# Patient Record
Sex: MALE | Race: WHITE | Employment: FULL TIME | URBAN - NONMETROPOLITAN AREA
[De-identification: names, ages, dates, MRNs, and addresses within clinical notes are randomized per-mention and may not be internally consistent; named-entity substitution may affect disease eponyms.]

---

## 2017-02-03 RX ORDER — HYDROCHLOROTHIAZIDE 25 MG/1
25 TABLET ORAL DAILY
Qty: 30 TABLET | Refills: 0 | Status: SHIPPED | OUTPATIENT
Start: 2017-02-03 | End: 2017-02-03

## 2017-02-21 ENCOUNTER — OFFICE VISIT (OUTPATIENT)
Dept: FAMILY MEDICINE CLINIC | Age: 42
End: 2017-02-21

## 2017-02-21 VITALS
BODY MASS INDEX: 50.12 KG/M2 | SYSTOLIC BLOOD PRESSURE: 140 MMHG | HEART RATE: 80 BPM | RESPIRATION RATE: 16 BRPM | DIASTOLIC BLOOD PRESSURE: 70 MMHG | WEIGHT: 315 LBS

## 2017-02-21 DIAGNOSIS — Z72.0 TOBACCO ABUSE: ICD-10-CM

## 2017-02-21 DIAGNOSIS — E88.81 DYSMETABOLIC SYNDROME: ICD-10-CM

## 2017-02-21 DIAGNOSIS — E78.00 PURE HYPERCHOLESTEROLEMIA: Primary | ICD-10-CM

## 2017-02-21 DIAGNOSIS — Z99.89 OBSTRUCTIVE SLEEP APNEA ON CPAP: ICD-10-CM

## 2017-02-21 DIAGNOSIS — M1A.0790 IDIOPATHIC CHRONIC GOUT OF FOOT WITHOUT TOPHUS, UNSPECIFIED LATERALITY: ICD-10-CM

## 2017-02-21 DIAGNOSIS — G47.33 OBSTRUCTIVE SLEEP APNEA ON CPAP: ICD-10-CM

## 2017-02-21 DIAGNOSIS — I10 ESSENTIAL HYPERTENSION: ICD-10-CM

## 2017-02-21 DIAGNOSIS — K21.9 GASTROESOPHAGEAL REFLUX DISEASE WITHOUT ESOPHAGITIS: ICD-10-CM

## 2017-02-21 PROCEDURE — 4004F PT TOBACCO SCREEN RCVD TLK: CPT | Performed by: FAMILY MEDICINE

## 2017-02-21 PROCEDURE — G8427 DOCREV CUR MEDS BY ELIG CLIN: HCPCS | Performed by: FAMILY MEDICINE

## 2017-02-21 PROCEDURE — 99214 OFFICE O/P EST MOD 30 MIN: CPT | Performed by: FAMILY MEDICINE

## 2017-02-21 PROCEDURE — G8484 FLU IMMUNIZE NO ADMIN: HCPCS | Performed by: FAMILY MEDICINE

## 2017-02-21 PROCEDURE — G8417 CALC BMI ABV UP PARAM F/U: HCPCS | Performed by: FAMILY MEDICINE

## 2017-02-21 RX ORDER — HYDROCHLOROTHIAZIDE 25 MG/1
TABLET ORAL
Qty: 30 TABLET | Refills: 11 | Status: SHIPPED | OUTPATIENT
Start: 2017-02-21 | End: 2018-03-01 | Stop reason: SDUPTHER

## 2017-02-21 RX ORDER — VARENICLINE TARTRATE 1 MG/1
1 TABLET, FILM COATED ORAL 2 TIMES DAILY
Qty: 60 TABLET | Refills: 4 | Status: SHIPPED | OUTPATIENT
Start: 2017-02-21 | End: 2017-05-31

## 2017-02-21 RX ORDER — VARENICLINE TARTRATE 25 MG
KIT ORAL
Qty: 1 EACH | Refills: 0 | Status: SHIPPED | OUTPATIENT
Start: 2017-02-21 | End: 2017-05-31

## 2017-02-21 RX ORDER — LOSARTAN POTASSIUM 50 MG/1
50 TABLET ORAL 2 TIMES DAILY
Qty: 60 TABLET | Refills: 11 | Status: SHIPPED | OUTPATIENT
Start: 2017-02-21 | End: 2017-02-28 | Stop reason: SDUPTHER

## 2017-02-21 RX ORDER — ATORVASTATIN CALCIUM 20 MG/1
TABLET, FILM COATED ORAL
Qty: 30 TABLET | Refills: 11 | Status: SHIPPED | OUTPATIENT
Start: 2017-02-21 | End: 2018-03-08 | Stop reason: SDUPTHER

## 2017-02-21 RX ORDER — ALLOPURINOL 100 MG/1
TABLET ORAL
Qty: 30 TABLET | Refills: 11 | Status: SHIPPED | OUTPATIENT
Start: 2017-02-21 | End: 2018-03-08 | Stop reason: SDUPTHER

## 2017-02-28 DIAGNOSIS — I10 ESSENTIAL HYPERTENSION: ICD-10-CM

## 2017-03-01 ENCOUNTER — OFFICE VISIT (OUTPATIENT)
Dept: CARDIOLOGY | Age: 42
End: 2017-03-01

## 2017-03-01 VITALS
DIASTOLIC BLOOD PRESSURE: 72 MMHG | WEIGHT: 315 LBS | SYSTOLIC BLOOD PRESSURE: 122 MMHG | BODY MASS INDEX: 39.17 KG/M2 | HEIGHT: 75 IN | HEART RATE: 76 BPM

## 2017-03-01 DIAGNOSIS — R06.02 SOB (SHORTNESS OF BREATH): ICD-10-CM

## 2017-03-01 DIAGNOSIS — E78.00 PURE HYPERCHOLESTEROLEMIA: Primary | ICD-10-CM

## 2017-03-01 DIAGNOSIS — Z82.49: ICD-10-CM

## 2017-03-01 DIAGNOSIS — G47.33 OBSTRUCTIVE SLEEP APNEA ON CPAP: ICD-10-CM

## 2017-03-01 DIAGNOSIS — E88.81 DYSMETABOLIC SYNDROME: ICD-10-CM

## 2017-03-01 DIAGNOSIS — Z99.89 OBSTRUCTIVE SLEEP APNEA ON CPAP: ICD-10-CM

## 2017-03-01 DIAGNOSIS — Z72.0 TOBACCO ABUSE: ICD-10-CM

## 2017-03-01 PROCEDURE — G8484 FLU IMMUNIZE NO ADMIN: HCPCS | Performed by: INTERNAL MEDICINE

## 2017-03-01 PROCEDURE — 99213 OFFICE O/P EST LOW 20 MIN: CPT | Performed by: INTERNAL MEDICINE

## 2017-03-01 PROCEDURE — G8417 CALC BMI ABV UP PARAM F/U: HCPCS | Performed by: INTERNAL MEDICINE

## 2017-03-01 PROCEDURE — G8427 DOCREV CUR MEDS BY ELIG CLIN: HCPCS | Performed by: INTERNAL MEDICINE

## 2017-03-01 PROCEDURE — 4004F PT TOBACCO SCREEN RCVD TLK: CPT | Performed by: INTERNAL MEDICINE

## 2017-03-01 RX ORDER — LOSARTAN POTASSIUM 50 MG/1
50 TABLET ORAL 2 TIMES DAILY
Qty: 60 TABLET | Refills: 11 | Status: SHIPPED | OUTPATIENT
Start: 2017-03-01 | End: 2018-03-08 | Stop reason: SDUPTHER

## 2017-05-31 ENCOUNTER — OFFICE VISIT (OUTPATIENT)
Dept: PULMONOLOGY | Age: 42
End: 2017-05-31

## 2017-05-31 VITALS
HEART RATE: 80 BPM | DIASTOLIC BLOOD PRESSURE: 68 MMHG | BODY MASS INDEX: 39.17 KG/M2 | SYSTOLIC BLOOD PRESSURE: 118 MMHG | WEIGHT: 315 LBS | OXYGEN SATURATION: 97 % | HEIGHT: 75 IN

## 2017-05-31 DIAGNOSIS — Z99.89 OSA ON CPAP: Primary | ICD-10-CM

## 2017-05-31 DIAGNOSIS — G47.33 OSA ON CPAP: Primary | ICD-10-CM

## 2017-05-31 PROCEDURE — 4004F PT TOBACCO SCREEN RCVD TLK: CPT | Performed by: INTERNAL MEDICINE

## 2017-05-31 PROCEDURE — G8427 DOCREV CUR MEDS BY ELIG CLIN: HCPCS | Performed by: INTERNAL MEDICINE

## 2017-05-31 PROCEDURE — G8417 CALC BMI ABV UP PARAM F/U: HCPCS | Performed by: INTERNAL MEDICINE

## 2017-05-31 PROCEDURE — 99213 OFFICE O/P EST LOW 20 MIN: CPT | Performed by: INTERNAL MEDICINE

## 2018-03-01 DIAGNOSIS — I10 ESSENTIAL HYPERTENSION: ICD-10-CM

## 2018-03-02 RX ORDER — HYDROCHLOROTHIAZIDE 25 MG/1
TABLET ORAL
Qty: 30 TABLET | Refills: 11 | Status: SHIPPED | OUTPATIENT
Start: 2018-03-02 | End: 2019-02-27 | Stop reason: SDUPTHER

## 2018-03-08 DIAGNOSIS — E78.00 PURE HYPERCHOLESTEROLEMIA: ICD-10-CM

## 2018-03-08 DIAGNOSIS — M1A.0790 IDIOPATHIC CHRONIC GOUT OF FOOT WITHOUT TOPHUS, UNSPECIFIED LATERALITY: ICD-10-CM

## 2018-03-08 DIAGNOSIS — I10 ESSENTIAL HYPERTENSION: ICD-10-CM

## 2018-03-08 RX ORDER — ALLOPURINOL 100 MG/1
TABLET ORAL
Qty: 30 TABLET | Refills: 11 | Status: SHIPPED | OUTPATIENT
Start: 2018-03-08 | End: 2019-02-27 | Stop reason: SDUPTHER

## 2018-03-08 RX ORDER — LOSARTAN POTASSIUM 50 MG/1
50 TABLET ORAL 2 TIMES DAILY
Qty: 60 TABLET | Refills: 11 | Status: SHIPPED | OUTPATIENT
Start: 2018-03-08 | End: 2019-02-27 | Stop reason: SDUPTHER

## 2018-03-08 RX ORDER — ATORVASTATIN CALCIUM 20 MG/1
TABLET, FILM COATED ORAL
Qty: 30 TABLET | Refills: 11 | Status: SHIPPED | OUTPATIENT
Start: 2018-03-08 | End: 2019-02-27 | Stop reason: SDUPTHER

## 2018-03-15 ENCOUNTER — OFFICE VISIT (OUTPATIENT)
Dept: FAMILY MEDICINE CLINIC | Age: 43
End: 2018-03-15
Payer: COMMERCIAL

## 2018-03-15 VITALS
SYSTOLIC BLOOD PRESSURE: 124 MMHG | RESPIRATION RATE: 16 BRPM | OXYGEN SATURATION: 99 % | DIASTOLIC BLOOD PRESSURE: 80 MMHG | HEIGHT: 74 IN | BODY MASS INDEX: 40.43 KG/M2 | HEART RATE: 87 BPM | WEIGHT: 315 LBS

## 2018-03-15 DIAGNOSIS — Z99.89 OBSTRUCTIVE SLEEP APNEA ON CPAP: ICD-10-CM

## 2018-03-15 DIAGNOSIS — Z72.0 TOBACCO ABUSE: ICD-10-CM

## 2018-03-15 DIAGNOSIS — E78.00 PURE HYPERCHOLESTEROLEMIA: ICD-10-CM

## 2018-03-15 DIAGNOSIS — K21.9 GASTROESOPHAGEAL REFLUX DISEASE WITHOUT ESOPHAGITIS: ICD-10-CM

## 2018-03-15 DIAGNOSIS — G47.33 OBSTRUCTIVE SLEEP APNEA ON CPAP: ICD-10-CM

## 2018-03-15 DIAGNOSIS — M79.10 MYALGIA: ICD-10-CM

## 2018-03-15 DIAGNOSIS — M1A.09X0 IDIOPATHIC CHRONIC GOUT OF MULTIPLE SITES WITHOUT TOPHUS: ICD-10-CM

## 2018-03-15 DIAGNOSIS — Z00.00 ROUTINE GENERAL MEDICAL EXAMINATION AT A HEALTH CARE FACILITY: ICD-10-CM

## 2018-03-15 DIAGNOSIS — Z83.3 FAMILY HISTORY OF DIABETES MELLITUS: ICD-10-CM

## 2018-03-15 DIAGNOSIS — E88.81 DYSMETABOLIC SYNDROME: ICD-10-CM

## 2018-03-15 DIAGNOSIS — I10 ESSENTIAL HYPERTENSION: Primary | ICD-10-CM

## 2018-03-15 LAB
INFLUENZA VIRUS A RNA: NEGATIVE
INFLUENZA VIRUS B RNA: NEGATIVE

## 2018-03-15 PROCEDURE — 99214 OFFICE O/P EST MOD 30 MIN: CPT | Performed by: FAMILY MEDICINE

## 2018-03-15 PROCEDURE — 4004F PT TOBACCO SCREEN RCVD TLK: CPT | Performed by: FAMILY MEDICINE

## 2018-03-15 PROCEDURE — 87502 INFLUENZA DNA AMP PROBE: CPT | Performed by: FAMILY MEDICINE

## 2018-03-15 PROCEDURE — G8427 DOCREV CUR MEDS BY ELIG CLIN: HCPCS | Performed by: FAMILY MEDICINE

## 2018-03-15 PROCEDURE — G8484 FLU IMMUNIZE NO ADMIN: HCPCS | Performed by: FAMILY MEDICINE

## 2018-03-15 PROCEDURE — G8417 CALC BMI ABV UP PARAM F/U: HCPCS | Performed by: FAMILY MEDICINE

## 2018-04-28 ENCOUNTER — HOSPITAL ENCOUNTER (OUTPATIENT)
Age: 43
Discharge: HOME | End: 2018-04-28
Payer: COMMERCIAL

## 2018-04-28 DIAGNOSIS — Z83.3 FAMILY HISTORY OF DIABETES MELLITUS: ICD-10-CM

## 2018-04-28 DIAGNOSIS — M1A.09X0 IDIOPATHIC CHRONIC GOUT OF MULTIPLE SITES WITHOUT TOPHUS: ICD-10-CM

## 2018-04-28 DIAGNOSIS — E78.00 PURE HYPERCHOLESTEROLEMIA: ICD-10-CM

## 2018-04-28 DIAGNOSIS — E88.81 DYSMETABOLIC SYNDROME: ICD-10-CM

## 2018-04-28 DIAGNOSIS — Z00.00 ROUTINE GENERAL MEDICAL EXAMINATION AT A HEALTH CARE FACILITY: ICD-10-CM

## 2018-04-28 LAB
ALBUMIN SERPL-MCNC: 4.3 G/DL (ref 3.5–5.1)
ALP BLD-CCNC: 64 U/L (ref 38–126)
ALT SERPL-CCNC: 33 U/L (ref 11–66)
ANION GAP SERPL CALCULATED.3IONS-SCNC: 13 MEQ/L (ref 8–16)
AST SERPL-CCNC: 22 U/L (ref 5–40)
AVERAGE GLUCOSE: 84 MG/DL (ref 70–126)
BILIRUB SERPL-MCNC: 0.7 MG/DL (ref 0.3–1.2)
BUN BLDV-MCNC: 16 MG/DL (ref 7–22)
CALCIUM SERPL-MCNC: 9.1 MG/DL (ref 8.5–10.5)
CHLORIDE BLD-SCNC: 103 MEQ/L (ref 98–111)
CHOLESTEROL, TOTAL: 112 MG/DL (ref 100–199)
CO2: 27 MEQ/L (ref 23–33)
CREAT SERPL-MCNC: 0.9 MG/DL (ref 0.4–1.2)
GFR SERPL CREATININE-BSD FRML MDRD: > 90 ML/MIN/1.73M2
GLUCOSE BLD-MCNC: 95 MG/DL (ref 70–108)
HBA1C MFR BLD: 4.8 % (ref 4.4–6.4)
HCT VFR BLD CALC: 40.8 % (ref 42–52)
HDLC SERPL-MCNC: 34 MG/DL
HEMOGLOBIN: 14 GM/DL (ref 14–18)
INSULIN, RANDOM OR FASTING: NORMAL
LDL CHOLESTEROL CALCULATED: 47 MG/DL
MCH RBC QN AUTO: 31.6 PG (ref 27–31)
MCHC RBC AUTO-ENTMCNC: 34.4 GM/DL (ref 33–37)
MCV RBC AUTO: 91.9 FL (ref 80–94)
PDW BLD-RTO: 12.8 % (ref 11.5–14.5)
PLATELET # BLD: 154 THOU/MM3 (ref 130–400)
PMV BLD AUTO: 8.7 FL (ref 7.4–10.4)
POTASSIUM SERPL-SCNC: 3.8 MEQ/L (ref 3.5–5.2)
RBC # BLD: 4.44 MILL/MM3 (ref 4.7–6.1)
SODIUM BLD-SCNC: 143 MEQ/L (ref 135–145)
TOTAL PROTEIN: 7.2 G/DL (ref 6.1–8)
TRIGL SERPL-MCNC: 157 MG/DL (ref 0–199)
TSH SERPL DL<=0.05 MIU/L-ACNC: 1.87 UIU/ML (ref 0.4–4.2)
URIC ACID: 7.5 MG/DL (ref 3.7–7)
WBC # BLD: 5.6 THOU/MM3 (ref 4.8–10.8)

## 2018-04-28 PROCEDURE — 80061 LIPID PANEL: CPT

## 2018-04-28 PROCEDURE — 85027 COMPLETE CBC AUTOMATED: CPT

## 2018-04-28 PROCEDURE — 36415 COLL VENOUS BLD VENIPUNCTURE: CPT

## 2018-04-28 PROCEDURE — 83036 HEMOGLOBIN GLYCOSYLATED A1C: CPT

## 2018-04-28 PROCEDURE — 84443 ASSAY THYROID STIM HORMONE: CPT

## 2018-04-28 PROCEDURE — 84550 ASSAY OF BLOOD/URIC ACID: CPT

## 2018-04-28 PROCEDURE — 83525 ASSAY OF INSULIN: CPT

## 2018-04-28 PROCEDURE — 80053 COMPREHEN METABOLIC PANEL: CPT

## 2018-04-30 ENCOUNTER — TELEPHONE (OUTPATIENT)
Dept: FAMILY MEDICINE CLINIC | Age: 43
End: 2018-04-30

## 2018-05-09 ENCOUNTER — OFFICE VISIT (OUTPATIENT)
Dept: CARDIOLOGY CLINIC | Age: 43
End: 2018-05-09
Payer: COMMERCIAL

## 2018-05-09 VITALS
DIASTOLIC BLOOD PRESSURE: 80 MMHG | BODY MASS INDEX: 40.43 KG/M2 | SYSTOLIC BLOOD PRESSURE: 132 MMHG | HEART RATE: 84 BPM | HEIGHT: 74 IN | WEIGHT: 315 LBS

## 2018-05-09 DIAGNOSIS — I10 ESSENTIAL HYPERTENSION: Primary | ICD-10-CM

## 2018-05-09 PROCEDURE — 99213 OFFICE O/P EST LOW 20 MIN: CPT | Performed by: INTERNAL MEDICINE

## 2018-05-09 PROCEDURE — 93000 ELECTROCARDIOGRAM COMPLETE: CPT | Performed by: INTERNAL MEDICINE

## 2018-05-09 PROCEDURE — G8427 DOCREV CUR MEDS BY ELIG CLIN: HCPCS | Performed by: INTERNAL MEDICINE

## 2018-05-09 PROCEDURE — 4004F PT TOBACCO SCREEN RCVD TLK: CPT | Performed by: INTERNAL MEDICINE

## 2018-05-09 PROCEDURE — G8417 CALC BMI ABV UP PARAM F/U: HCPCS | Performed by: INTERNAL MEDICINE

## 2018-05-09 ASSESSMENT — ENCOUNTER SYMPTOMS
BOWEL INCONTINENCE: 0
ABDOMINAL PAIN: 0
CONSTIPATION: 0
EYE DISCHARGE: 0
BLOATING: 0
ORTHOPNEA: 0
BLURRED VISION: 0
HOARSE VOICE: 0
BACK PAIN: 0
DIARRHEA: 0
COUGH: 0
HEMOPTYSIS: 0
CHANGE IN BOWEL HABIT: 0
EYE PAIN: 0
SPUTUM PRODUCTION: 0
SHORTNESS OF BREATH: 0
DOUBLE VISION: 0

## 2018-09-20 ENCOUNTER — OFFICE VISIT (OUTPATIENT)
Dept: FAMILY MEDICINE CLINIC | Age: 43
End: 2018-09-20
Payer: COMMERCIAL

## 2018-09-20 VITALS
RESPIRATION RATE: 18 BRPM | DIASTOLIC BLOOD PRESSURE: 84 MMHG | HEART RATE: 72 BPM | SYSTOLIC BLOOD PRESSURE: 138 MMHG | WEIGHT: 315 LBS | BODY MASS INDEX: 54.64 KG/M2

## 2018-09-20 DIAGNOSIS — Z99.89 OBSTRUCTIVE SLEEP APNEA ON CPAP: ICD-10-CM

## 2018-09-20 DIAGNOSIS — I10 ESSENTIAL HYPERTENSION: Primary | ICD-10-CM

## 2018-09-20 DIAGNOSIS — E78.00 PURE HYPERCHOLESTEROLEMIA: ICD-10-CM

## 2018-09-20 DIAGNOSIS — Z72.0 TOBACCO ABUSE: ICD-10-CM

## 2018-09-20 DIAGNOSIS — E88.81 DYSMETABOLIC SYNDROME: ICD-10-CM

## 2018-09-20 DIAGNOSIS — G47.33 OBSTRUCTIVE SLEEP APNEA ON CPAP: ICD-10-CM

## 2018-09-20 DIAGNOSIS — M1A.0720 CHRONIC IDIOPATHIC GOUT INVOLVING TOE OF LEFT FOOT WITHOUT TOPHUS: ICD-10-CM

## 2018-09-20 DIAGNOSIS — K21.9 GASTROESOPHAGEAL REFLUX DISEASE WITHOUT ESOPHAGITIS: ICD-10-CM

## 2018-09-20 PROCEDURE — G8417 CALC BMI ABV UP PARAM F/U: HCPCS | Performed by: FAMILY MEDICINE

## 2018-09-20 PROCEDURE — 4004F PT TOBACCO SCREEN RCVD TLK: CPT | Performed by: FAMILY MEDICINE

## 2018-09-20 PROCEDURE — G8427 DOCREV CUR MEDS BY ELIG CLIN: HCPCS | Performed by: FAMILY MEDICINE

## 2018-09-20 PROCEDURE — 99214 OFFICE O/P EST MOD 30 MIN: CPT | Performed by: FAMILY MEDICINE

## 2018-09-20 ASSESSMENT — PATIENT HEALTH QUESTIONNAIRE - PHQ9
2. FEELING DOWN, DEPRESSED OR HOPELESS: 0
SUM OF ALL RESPONSES TO PHQ QUESTIONS 1-9: 0
SUM OF ALL RESPONSES TO PHQ QUESTIONS 1-9: 0
SUM OF ALL RESPONSES TO PHQ9 QUESTIONS 1 & 2: 0
1. LITTLE INTEREST OR PLEASURE IN DOING THINGS: 0

## 2018-09-20 NOTE — PROGRESS NOTES
[facility] [facility]  [address] [place] [address]  [address] [place] [address]  Dept: [phone]  Dept Fax: [phone]  Loc: [phone]    Leonora Will is a 37 y.o. male who presents today for:  Chief Complaint   Patient presents with    6 Month Follow-Up     HTN, pure hypercholesterolemia            HPI:     HPI    Hyperlipidemia: Patient presents with hyperlipidemia. He was tested because hypertension. His last labs showed   Lab Results   Component Value Date    CHOL 112 04/28/2018    CHOL 113 03/04/2017    CHOL 124 07/22/2015     Lab Results   Component Value Date    TRIG 157 04/28/2018    TRIG 108 03/04/2017    TRIG 132 07/22/2015     Lab Results   Component Value Date    HDL 34 04/28/2018    HDL 36 03/04/2017    HDL 40 07/22/2015     Lab Results   Component Value Date    LDLCALC 47 04/28/2018    LDLCALC 55 03/04/2017    [address] 58 07/22/2015     No results found for: LABVLDL, VLDL  No results found for: CHOLHDLRATIO  There is a family history of hyperlipidemia. There is a family history of early ischemia heart disease. Hypertension: Patient here for follow-up of elevated blood pressure. He is exercising and is not adherent to low salt diet. Blood pressure is well controlled at home. Cardiac symptoms none. Patient denies chest pain, dyspnea and palpitations. Cardiovascular risk factors: none. Use of agents associated with hypertension: none. History of target organ damage: none. GERD well controlled on the PPI. BHARATHI on CPAP. Gout controlled on allopurinol. reports that he has been smoking Cigarettes. He has a 19.00 pack-year smoking history. He has never used smokeless tobacco.        Reviewed chart for past medical history , surgical history , allergies, social history , family history and medications.     Health Maintenance   Topic Date Due    Flu vaccine (1) 09/20/2019 (Originally 9/1/2018)    Pneumococcal med risk (1 of 1 - PPSV23) 02/21/2026 (Originally 9/14/1994)    Potassium monitoring  04/28/2019    Creatinine monitoring  04/28/2019    DTaP/Tdap/Td vaccine (2 - Td) 07/31/2022    Lipid screen  04/28/2023    HIV screen  Excluded       Subjective:      Constitutional: Negative for fever, chills, diaphoresis, activity change, appetite change and fatigue. HENT: Negative for hearing loss, ear pain, congestion, sore throat, rhinorrhea, postnasal drip and ear discharge. Eyes: Negative for photophobia and visual disturbance. Respiratory: Negative for cough, chest tightness, shortness of breath and wheezing. Cardiovascular: Negative for chest pain and leg swelling. Gastrointestinal: Negative for nausea, vomiting, abdominal pain, diarrhea and constipation. Genitourinary: Negative for dysuria, urgency and frequency. Neurological: Negative for weakness, light-headedness and headaches. Psychiatric/Behavioral: Negative for sleep disturbance. Objective:     Vitals:    09/20/18 1609   BP: 138/84   Site: Left Wrist   Position: Sitting   Cuff Size: Medium Adult   Pulse: 72   Resp: 18   Weight: (!) 425 lb 9.6 oz (193.1 kg)     Wt Readings from Last 3 Encounters:   09/20/18 (!) 425 lb 9.6 oz (193.1 kg)   05/09/18 (!) 415 lb (188.2 kg)   03/15/18 (!) 414 lb 9.6 oz (188.1 kg)       Physical Exam  Physical Exam   Constitutional: Vital signs are normal. He appears well-developed and well-nourished. He is active. HENT:   Head: Normocephalic and atraumatic. Right Ear: Tympanic membrane, external ear and ear canal normal. No drainage or tenderness. Left Ear: Tympanic membrane, external ear and ear canal normal. No drainage or tenderness. Nose: Nose normal. No mucosal edema or rhinorrhea. Mouth/Throat: Uvula is midline, oropharynx is clear and moist and mucous membranes are normal. Mucous membranes are not pale. Normal dentition. No posterior oropharyngeal edema or posterior oropharyngeal erythema.    Eyes: Lids are normal. Right eye exhibits no chemosis and no discharge. Left eye exhibits no chemosis and no drainage. Right conjunctiva has no hemorrhage. Left conjunctiva has no hemorrhage. Right eye exhibits normal extraocular motion. Left eye exhibits normal extraocular motion. Right pupil is round and reactive. Left pupil is round and reactive. Pupils are equal.   Cardiovascular: Normal rate, regular rhythm, S1 normal, S2 normal and normal heart sounds. Exam reveals no gallop. No murmur heard. Pulmonary/Chest: Effort normal and breath sounds normal. No respiratory distress. He has no wheezes. He has no rhonchi. He has no rales. Abdominal: Soft. Normal appearance and bowel sounds are normal. He exhibits no distension and no mass. There is no hepatosplenomegaly. No tenderness. He has no rigidity, no rebound and no guarding. No hernia. Musculoskeletal:        Right lower leg: He exhibits 1+ edema with compression stockings on. Left lower leg: He exhibits 1+ edema with compression stsockings on. Neurological: He is alert. Oriented and pleasent        Assessment/Plan   Darlyn Elizalde was seen today for 6 month follow-up. Diagnoses and all orders for this visit:    Essential hypertension    Pure hypercholesterolemia  -     Comprehensive Metabolic Panel; Future  -     Lipid Panel; Future    Gastroesophageal reflux disease without esophagitis  -     CBC; Future    Obstructive sleep apnea on CPAP    Tobacco abuse    Dysmetabolic syndrome  -     Comprehensive Metabolic Panel; Future  -     TSH With Reflex Ft4; Future  -     Hemoglobin A1C; Future    Chronic idiopathic gout involving toe of left foot without tophus    No change to medications   Continue healthy diet and exercise  Aspirin daily    Barre foods  Small meals  No late meals    Discussed use, benefit, and side effects of prescribed medications. All patient questions answered. Pt voiced understanding. Reviewed health maintenance.   Instructed to continue current medications, diet and exercise. Patient agreed with treatment plan. Follow up as directed.      Electronically signed by Kit Julian MD

## 2018-10-17 ENCOUNTER — TELEPHONE (OUTPATIENT)
Dept: PULMONOLOGY | Age: 43
End: 2018-10-17

## 2019-02-27 DIAGNOSIS — E78.00 PURE HYPERCHOLESTEROLEMIA: ICD-10-CM

## 2019-02-27 DIAGNOSIS — I10 ESSENTIAL HYPERTENSION: ICD-10-CM

## 2019-02-27 DIAGNOSIS — M1A.0790 IDIOPATHIC CHRONIC GOUT OF FOOT WITHOUT TOPHUS, UNSPECIFIED LATERALITY: ICD-10-CM

## 2019-02-28 RX ORDER — HYDROCHLOROTHIAZIDE 25 MG/1
TABLET ORAL
Qty: 30 TABLET | Refills: 11 | Status: SHIPPED | OUTPATIENT
Start: 2019-02-28 | End: 2020-03-02 | Stop reason: SDUPTHER

## 2019-02-28 RX ORDER — ALLOPURINOL 100 MG/1
TABLET ORAL
Qty: 30 TABLET | Refills: 11 | Status: SHIPPED | OUTPATIENT
Start: 2019-02-28 | End: 2020-03-02 | Stop reason: SDUPTHER

## 2019-02-28 RX ORDER — ATORVASTATIN CALCIUM 20 MG/1
TABLET, FILM COATED ORAL
Qty: 30 TABLET | Refills: 11 | Status: SHIPPED | OUTPATIENT
Start: 2019-02-28 | End: 2020-03-02

## 2019-02-28 RX ORDER — LOSARTAN POTASSIUM 50 MG/1
50 TABLET ORAL 2 TIMES DAILY
Qty: 60 TABLET | Refills: 11 | Status: SHIPPED | OUTPATIENT
Start: 2019-02-28 | End: 2020-03-13

## 2019-03-07 ENCOUNTER — OFFICE VISIT (OUTPATIENT)
Dept: PULMONOLOGY | Age: 44
End: 2019-03-07
Payer: COMMERCIAL

## 2019-03-07 VITALS
BODY MASS INDEX: 40.43 KG/M2 | DIASTOLIC BLOOD PRESSURE: 68 MMHG | HEIGHT: 74 IN | OXYGEN SATURATION: 98 % | WEIGHT: 315 LBS | SYSTOLIC BLOOD PRESSURE: 112 MMHG | HEART RATE: 85 BPM

## 2019-03-07 DIAGNOSIS — E66.01 MORBID OBESITY WITH BMI OF 50.0-59.9, ADULT (HCC): ICD-10-CM

## 2019-03-07 DIAGNOSIS — Z99.89 OBSTRUCTIVE SLEEP APNEA ON CPAP: Primary | ICD-10-CM

## 2019-03-07 DIAGNOSIS — G47.33 OBSTRUCTIVE SLEEP APNEA ON CPAP: Primary | ICD-10-CM

## 2019-03-07 PROCEDURE — G8484 FLU IMMUNIZE NO ADMIN: HCPCS | Performed by: PHYSICIAN ASSISTANT

## 2019-03-07 PROCEDURE — 4004F PT TOBACCO SCREEN RCVD TLK: CPT | Performed by: PHYSICIAN ASSISTANT

## 2019-03-07 PROCEDURE — G8417 CALC BMI ABV UP PARAM F/U: HCPCS | Performed by: PHYSICIAN ASSISTANT

## 2019-03-07 PROCEDURE — 99213 OFFICE O/P EST LOW 20 MIN: CPT | Performed by: PHYSICIAN ASSISTANT

## 2019-03-07 PROCEDURE — G8427 DOCREV CUR MEDS BY ELIG CLIN: HCPCS | Performed by: PHYSICIAN ASSISTANT

## 2019-03-07 ASSESSMENT — ENCOUNTER SYMPTOMS
BACK PAIN: 0
EYES NEGATIVE: 1
STRIDOR: 0
NAUSEA: 0
WHEEZING: 0
SHORTNESS OF BREATH: 0
COUGH: 0
DIARRHEA: 0
ALLERGIC/IMMUNOLOGIC NEGATIVE: 1
CHEST TIGHTNESS: 0

## 2019-03-25 ENCOUNTER — OFFICE VISIT (OUTPATIENT)
Dept: FAMILY MEDICINE CLINIC | Age: 44
End: 2019-03-25
Payer: COMMERCIAL

## 2019-03-25 VITALS
RESPIRATION RATE: 16 BRPM | WEIGHT: 315 LBS | BODY MASS INDEX: 54.7 KG/M2 | SYSTOLIC BLOOD PRESSURE: 130 MMHG | TEMPERATURE: 97.9 F | HEART RATE: 78 BPM | DIASTOLIC BLOOD PRESSURE: 80 MMHG

## 2019-03-25 DIAGNOSIS — K21.9 GASTROESOPHAGEAL REFLUX DISEASE WITHOUT ESOPHAGITIS: ICD-10-CM

## 2019-03-25 DIAGNOSIS — Z99.89 OBSTRUCTIVE SLEEP APNEA ON CPAP: ICD-10-CM

## 2019-03-25 DIAGNOSIS — E78.00 PURE HYPERCHOLESTEROLEMIA: ICD-10-CM

## 2019-03-25 DIAGNOSIS — I10 ESSENTIAL HYPERTENSION: Primary | ICD-10-CM

## 2019-03-25 DIAGNOSIS — G47.33 OBSTRUCTIVE SLEEP APNEA ON CPAP: ICD-10-CM

## 2019-03-25 DIAGNOSIS — M1A.0790 IDIOPATHIC CHRONIC GOUT OF FOOT WITHOUT TOPHUS, UNSPECIFIED LATERALITY: ICD-10-CM

## 2019-03-25 DIAGNOSIS — E88.81 DYSMETABOLIC SYNDROME: ICD-10-CM

## 2019-03-25 DIAGNOSIS — Z72.0 TOBACCO ABUSE: ICD-10-CM

## 2019-03-25 PROCEDURE — 99214 OFFICE O/P EST MOD 30 MIN: CPT | Performed by: FAMILY MEDICINE

## 2019-03-25 PROCEDURE — 4004F PT TOBACCO SCREEN RCVD TLK: CPT | Performed by: FAMILY MEDICINE

## 2019-03-25 PROCEDURE — G8427 DOCREV CUR MEDS BY ELIG CLIN: HCPCS | Performed by: FAMILY MEDICINE

## 2019-03-25 PROCEDURE — G8417 CALC BMI ABV UP PARAM F/U: HCPCS | Performed by: FAMILY MEDICINE

## 2019-03-25 PROCEDURE — G8484 FLU IMMUNIZE NO ADMIN: HCPCS | Performed by: FAMILY MEDICINE

## 2019-03-25 ASSESSMENT — PATIENT HEALTH QUESTIONNAIRE - PHQ9
1. LITTLE INTEREST OR PLEASURE IN DOING THINGS: 0
2. FEELING DOWN, DEPRESSED OR HOPELESS: 0
SUM OF ALL RESPONSES TO PHQ QUESTIONS 1-9: 0
SUM OF ALL RESPONSES TO PHQ9 QUESTIONS 1 & 2: 0
SUM OF ALL RESPONSES TO PHQ QUESTIONS 1-9: 0

## 2019-04-08 ENCOUNTER — TELEPHONE (OUTPATIENT)
Dept: PULMONOLOGY | Age: 44
End: 2019-04-08

## 2019-05-22 ENCOUNTER — OFFICE VISIT (OUTPATIENT)
Dept: CARDIOLOGY CLINIC | Age: 44
End: 2019-05-22
Payer: COMMERCIAL

## 2019-05-22 VITALS
WEIGHT: 315 LBS | HEIGHT: 74 IN | HEART RATE: 90 BPM | SYSTOLIC BLOOD PRESSURE: 142 MMHG | BODY MASS INDEX: 40.43 KG/M2 | DIASTOLIC BLOOD PRESSURE: 78 MMHG

## 2019-05-22 DIAGNOSIS — I10 ESSENTIAL HYPERTENSION: Primary | ICD-10-CM

## 2019-05-22 PROCEDURE — 4004F PT TOBACCO SCREEN RCVD TLK: CPT | Performed by: INTERNAL MEDICINE

## 2019-05-22 PROCEDURE — 93000 ELECTROCARDIOGRAM COMPLETE: CPT | Performed by: INTERNAL MEDICINE

## 2019-05-22 PROCEDURE — 99213 OFFICE O/P EST LOW 20 MIN: CPT | Performed by: INTERNAL MEDICINE

## 2019-05-22 PROCEDURE — G8427 DOCREV CUR MEDS BY ELIG CLIN: HCPCS | Performed by: INTERNAL MEDICINE

## 2019-05-22 PROCEDURE — G8417 CALC BMI ABV UP PARAM F/U: HCPCS | Performed by: INTERNAL MEDICINE

## 2019-05-22 NOTE — PROGRESS NOTES
Pt here 1 yr check up     Pt had EKG done today      Pt continues with chest pain ,not very often , describes as shooting pain, last had 3 mo ago, describes as a quick pain unable to rate from 1-10, does not notice with activity, dizziness at times, swelling in left leg at times, wears compreshion hose , heart palpitations, no change     Pt states is under more stress     Pt denies sob,

## 2019-05-22 NOTE — PROGRESS NOTES
[facility]  [address]  Dept: [phone]  Dept Fax: [phone]: [phone]    Visit Date: 5/22/2019    Mr. Deondre Moran is a 37 y.o. male  who presented for:  Chief Complaint   Patient presents with    Check-Up    Hypertension       HPI:   42 yo M c hx HTN, HLD, Smokes (15cigs per day), BHARATHI on CPAP is here for a follow up. Patient has previously seen Dr. Vane Sanford in the past.   Denies any chest pain,no radiating, no aggravating or alleviating factors, not associated sob, palpitations, lightheadedness, dizziness, orthopnea, PND or pedal edema.                Current Outpatient Medications:     Esomeprazole Magnesium (NEXIUM PO), Take by mouth daily, Disp: , Rfl:     hydrochlorothiazide (HYDRODIURIL) 25 MG tablet, TAKE ONE TABLET BY MOUTH EVERY DAY, Disp: 30 tablet, Rfl: 11    losartan (COZAAR) 50 MG tablet, Take 1 tablet by mouth 2 times daily, Disp: 60 tablet, Rfl: 11    allopurinol (ZYLOPRIM) 100 MG tablet, TAKE ONE TABLET BY MOUTH EVERY DAY, Disp: 30 tablet, Rfl: 11    atorvastatin (LIPITOR) 20 MG tablet, TAKE ONE TABLET BY MOUTH EVERY DAY, Disp: 30 tablet, Rfl: 11    ZINC PO, Take by mouth, Disp: , Rfl:     Past Medical History  Akin Agarwal  has a past medical history of Benign intracranial hypertension, Dysmetabolic syndrome, Family history of premature ventricular contractions, GERD (gastroesophageal reflux disease), Gout, Hemorrhoids, History of morbid obesity, Hyperlipemia, BHARATHI on CPAP, Pseudotumor cerebri, Second opinion for hypertension, and Tobacco abuse. Social History  Akin Agarwal  reports that he has been smoking cigarettes. He started smoking about 25 years ago. He has a 25.00 pack-year smoking history. He has never used smokeless tobacco. He reports that he drinks about 7.2 oz of alcohol per week. He reports that he does not use drugs.     Family History  Akin Agarwal family history includes Cancer in his maternal grandmother, mother, and paternal aunt; Diabetes in his father; Heart Disease in his father; Pacemaker in his father. Past Surgical History   Past Surgical History:   Procedure Laterality Date    CARDIOVASCULAR STRESS TEST  5 29 2007    Preserved LV function and estimated EF of 65%.  FINGER SURGERY      HAND TENDON SURGERY      TRANSTHORACIC ECHOCARDIOGRAM  1 11 2011    EF 55-65% with macrocytic LVH, stage I dysfunction, left atrium mildly dilated probably secondary to diastolic dysfunction. Demonstrated right ventricle markedly dilated. Subjective:     REVIEW OF SYSTEMS  Constitutional: denies sweats, chills and fever  HENT: denies  congestion, sinus pressure, sneezing and sore throat. Eyes: denies  pain, discharge, redness and itching. Respiratory: denies apnea, cough  Gastrointestinal: denies blood in stool, constipation, diarrhea   Endocrine: denies cold intolerance, heat intolerance, polydipsia. Genitourinary: denies dysuria, enuresis, flank pain and hematuria. Musculoskeletal: denies arthralgias, joint swelling and neck pain. Neurological: denies numbness and headaches. Psychiatric/Behavioral: denies agitation, confusion, decreased concentration and dysphoric mood    All others reviewed and are negative. Objective:     BP (!) 158/88   Pulse 90   Ht 6' 2\" (1.88 m)   Wt (!) 424 lb (192.3 kg)   BMI 54.44 kg/m²     Wt Readings from Last 3 Encounters:   05/22/19 (!) 424 lb (192.3 kg)   03/25/19 (!) 426 lb (193.2 kg)   03/07/19 (!) 420 lb (190.5 kg)     BP Readings from Last 3 Encounters:   05/22/19 (!) 158/88   03/25/19 130/80   03/07/19 112/68       PHYSICAL EXAM  Constitutional: Oriented to person, place, and time. Appears well-developed and well-nourished. HENT:   Head: Normocephalic and atraumatic. Eyes: EOM are normal. Pupils are equal, round, and reactive to light. Neck: Normal range of motion. Neck supple. No JVD present.    Cardiovascular: Normal rate , normal heart sounds and intact distal pulses. Pulmonary/Chest: Effort normal and breath sounds normal. No respiratory distress. No wheezes. No rales. Abdominal: Soft. Bowel sounds are normal. No distension. There is no tenderness. Musculoskeletal: Normal range of motion. No edema. Neurological: Alert and oriented to person, place, and time. No cranial nerve deficit. Coordination normal.   Skin: Skin is warm and dry. Psychiatric: Normal mood and affect.        No results found for: CKTOTAL, CKMB, CKMBINDEX    Lab Results   Component Value Date    WBC 5.6 04/28/2018    RBC 4.44 04/28/2018    HGB 14.0 04/28/2018    HCT 40.8 04/28/2018    MCV 91.9 04/28/2018    MCH 31.6 04/28/2018    MCHC 34.4 04/28/2018    RDW 12.8 04/28/2018     04/28/2018    MPV 8.7 04/28/2018       Lab Results   Component Value Date     04/28/2018    K 3.8 04/28/2018     04/28/2018    CO2 27 04/28/2018    BUN 16 04/28/2018    LABALBU 4.3 04/28/2018    CREATININE 0.9 04/28/2018    CALCIUM 9.1 04/28/2018    LABGLOM >90 04/28/2018    GLUCOSE 95 04/28/2018       Lab Results   Component Value Date    ALKPHOS 64 04/28/2018    ALT 33 04/28/2018    AST 22 04/28/2018    PROT 7.2 04/28/2018    BILITOT 0.7 04/28/2018    BILIDIR <0.2 07/22/2015    LABALBU 4.3 04/28/2018       No results found for: MG    No results found for: INR, PROTIME      Lab Results   Component Value Date    LABA1C 4.8 04/28/2018       Lab Results   Component Value Date    TRIG 157 04/28/2018    HDL 34 04/28/2018    LDLCALC 47 04/28/2018       Lab Results   Component Value Date    TSH 1.870 04/28/2018         Testing Reviewed:      I haveindividually reviewed the below cardiac tests    EKG:    ECHO:   Results for orders placed during the hospital encounter of 03/10/17   ECHO Complete 2D W Doppler W Color    Narrative Transthoracic Echocardiography Report (TTE)     Demographics      Patient Name    [facility] CHERY HERNÁNDEZ Gender               Male      MR #            [id number]       Race                                       Ethnicity      Account #       [de-identified]         Room Number      Accession       [id number]       Date of Study        03/10/2017   Number      Date of Birth   1975      Referring Physician  Josesito Cheema MD      Age             39 year(s)      [address]                                      Interpreting         Kate Dandy DO                                   Physician     Procedure    Type of Study      TTE procedure:ECHOCARDIOGRAM COMPLETE 2D W DOPPLER W COLOR. Procedure Date  Date: 03/10/2017 Start: 01:43 PM    Study Location: Echo Lab  Technical Quality: Limited visualization due to body habitus. Indications:Shortness of breath and Hypertension. Additional Medical History:Sleep apnea, GERD, hypertension, hyperlipidemia,  smoker    Patient Status: Routine    Height: 75 inches Weight: 400.01 pounds BSA: 2.95 m^2 BMI: 50 kg/m^2    BP: 122/72 mmHg     Conclusions      Summary   Systolic function was normal.   Ejection fraction is visually estimated at 55%. The left ventricle was not well visualized. Doppler parameters were consistent with abnormal left ventricular   relaxation (grade 1 diastolic dysfunction). Signature      ----------------------------------------------------------------   Electronically signed by Kate Dandy DO (Interpreting   physician) on 03/10/2017 at 04:14 PM   ----------------------------------------------------------------      Findings      Mitral Valve   Structurally normal mitral valve. Trace to mild mitral regurgitation is present. The mitral valve was not well visualized . Aortic Valve   The aortic valve appears to be trileaflet with good leaflet separation. Tricuspid Valve   Tricuspid valve is structurally normal.   Tricuspid valve was not well visualized.    trivial tricuspid regurgitation. Pulmonic Valve   Pulmonic valve is structurally normal.      Left Atrium   top Normal size left atrium. Left Ventricle   Systolic function was normal.   Ejection fraction is visually estimated at 55%. The left ventricle was not well visualized. Doppler parameters were consistent with abnormal left ventricular   relaxation (grade 1 diastolic dysfunction). Right Atrium   The right atrium is of normal size. Right Ventricle   Normal right ventricular size and function. Pericardial Effusion   No evidence of any pericardial effusion.      M-Mode/2D Measurements & Calculations      LV Diastolic    LV Systolic Dimension: 3.7   AV Cusp Separation: 2.5 cmLA   Dimension: 5.1  cm                           Dimension: 4 cmAO Root   cm              LV Volume Diastolic: 775 ml  Dimension: 3.8 cm   LV FS:27.5 %    LV Volume Systolic: 20.7 ml   LV PW           LV EDV/LV EDV Index: 292   Diastolic: 1 cm OV/18 W^0WD ESV/LV ESV   Septum          Index: 58.1 ml/20 m^2        RV Diastolic Dimension: 2.8   Diastolic: 0.9  EF Calculated: 53.2 %        cm   cm                                                LA/Aorta: 1.05     Doppler Measurements & Calculations      MV Peak E-Wave: 104 cm/s   AV Peak Velocity: 147 LVOT Peak Velocity: 129   MV Peak A-Wave: 72.1 cm/s  cm/s                  cm/s   MV E/A Ratio: 1.44         AV Peak Gradient:     LVOT Peak Gradient: 7   MV Peak Gradient: 4.33     8.64 mmHg             mmHg   mmHg                                                    TV Peak E-Wave: 68.1 cm/s   MV Deceleration Time: 232                        TV Peak A-Wave: 45.9 cm/s   msec                                                    TV Peak Gradient: 1.86                                                    mmHg   MV E' Septal Velocity:                           TR Velocity:198 cm/s   8.68 cm/s                  AV DVI (Vmax):0.88    TR Gradient:15.68 mmHg   MV A' Septal Velocity: PV Peak Velocity: 66.1   9.07 cm/s                                        cm/s   MV E' Lateral Velocity:                          PV Peak Gradient: 1.75   10.6 cm/s                                        mmHg   MV A' Lateral Velocity:   10.6 cm/s   E/E' septal: 11.98   E/E' lateral: 9.81   MR Velocity: 330 cm/s     [id number]       STRESS:    CATH:    Assessment/Plan       Diagnosis Orders   1. Essential hypertension  EKG 12 Lead     ASCVD  BHARATHI on CPAP  Smoker  HTN     Reviewed EKG and prior workup  No symptoms at presents  Advised to loose weigth  BP controlled  Continue Losartan, HCTZ, lipitor  The patient is asked to make an attempt to improve diet and exercise patterns to aid in medical management of this problem. Smoking: discussed with the patient the importance of smoke cessation especially with the risk of CAD. Advised patient to call office or seek immediate medical attention if there is any new onset of  any chest pain, sob, palpitations, lightheadedness, dizziness, orthopnea, PND or pedal edema. Thank youfor allowing me to participate in the care of this patient. Please do not hesitate to contact me for any further questions. Return in about 1 year (around 5/22/2020), or if symptoms worsen or fail to improve, for Review testing, Regular follow up.        Electronically signed by Pamela Kelley MD [facility]  5/22/2019 at 3:11 PM

## 2019-05-28 ENCOUNTER — NURSE ONLY (OUTPATIENT)
Dept: LAB | Age: 44
End: 2019-05-28

## 2019-05-28 DIAGNOSIS — E88.81 DYSMETABOLIC SYNDROME: ICD-10-CM

## 2019-05-28 DIAGNOSIS — E78.00 PURE HYPERCHOLESTEROLEMIA: ICD-10-CM

## 2019-05-28 DIAGNOSIS — K21.9 GASTROESOPHAGEAL REFLUX DISEASE WITHOUT ESOPHAGITIS: ICD-10-CM

## 2019-05-28 LAB
ALBUMIN SERPL-MCNC: 4.2 G/DL (ref 3.5–5.1)
ALP BLD-CCNC: 69 U/L (ref 38–126)
ALT SERPL-CCNC: 27 U/L (ref 11–66)
ANION GAP SERPL CALCULATED.3IONS-SCNC: 14 MEQ/L (ref 8–16)
AST SERPL-CCNC: 19 U/L (ref 5–40)
AVERAGE GLUCOSE: 81 MG/DL (ref 70–126)
BILIRUB SERPL-MCNC: 0.6 MG/DL (ref 0.3–1.2)
BUN BLDV-MCNC: 17 MG/DL (ref 7–22)
CALCIUM SERPL-MCNC: 9.2 MG/DL (ref 8.5–10.5)
CHLORIDE BLD-SCNC: 105 MEQ/L (ref 98–111)
CHOLESTEROL, TOTAL: 106 MG/DL (ref 100–199)
CO2: 26 MEQ/L (ref 23–33)
CREAT SERPL-MCNC: 0.9 MG/DL (ref 0.4–1.2)
ERYTHROCYTE [DISTWIDTH] IN BLOOD BY AUTOMATED COUNT: 13.2 % (ref 11.5–14.5)
ERYTHROCYTE [DISTWIDTH] IN BLOOD BY AUTOMATED COUNT: 46.5 FL (ref 35–45)
GFR SERPL CREATININE-BSD FRML MDRD: > 90 ML/MIN/1.73M2
GLUCOSE BLD-MCNC: 102 MG/DL (ref 70–108)
HBA1C MFR BLD: 4.7 % (ref 4.4–6.4)
HCT VFR BLD CALC: 43.1 % (ref 42–52)
HDLC SERPL-MCNC: 38 MG/DL
HEMOGLOBIN: 14.1 GM/DL (ref 14–18)
LDL CHOLESTEROL CALCULATED: 42 MG/DL
MCH RBC QN AUTO: 31.4 PG (ref 26–33)
MCHC RBC AUTO-ENTMCNC: 32.7 GM/DL (ref 32.2–35.5)
MCV RBC AUTO: 96 FL (ref 80–94)
PLATELET # BLD: 142 THOU/MM3 (ref 130–400)
PMV BLD AUTO: 10.3 FL (ref 9.4–12.4)
POTASSIUM SERPL-SCNC: 4.1 MEQ/L (ref 3.5–5.2)
RBC # BLD: 4.49 MILL/MM3 (ref 4.7–6.1)
SODIUM BLD-SCNC: 145 MEQ/L (ref 135–145)
TOTAL PROTEIN: 6.8 G/DL (ref 6.1–8)
TRIGL SERPL-MCNC: 131 MG/DL (ref 0–199)
TSH SERPL DL<=0.05 MIU/L-ACNC: 1.71 UIU/ML (ref 0.4–4.2)
WBC # BLD: 5.6 THOU/MM3 (ref 4.8–10.8)

## 2019-10-29 ENCOUNTER — OFFICE VISIT (OUTPATIENT)
Dept: FAMILY MEDICINE CLINIC | Age: 44
End: 2019-10-29
Payer: COMMERCIAL

## 2019-10-29 VITALS
BODY MASS INDEX: 40.43 KG/M2 | DIASTOLIC BLOOD PRESSURE: 74 MMHG | HEIGHT: 74 IN | HEART RATE: 92 BPM | WEIGHT: 315 LBS | RESPIRATION RATE: 16 BRPM | TEMPERATURE: 97.5 F | SYSTOLIC BLOOD PRESSURE: 120 MMHG

## 2019-10-29 DIAGNOSIS — E88.81 DYSMETABOLIC SYNDROME: ICD-10-CM

## 2019-10-29 DIAGNOSIS — E78.00 PURE HYPERCHOLESTEROLEMIA: ICD-10-CM

## 2019-10-29 DIAGNOSIS — K21.9 GASTROESOPHAGEAL REFLUX DISEASE WITHOUT ESOPHAGITIS: ICD-10-CM

## 2019-10-29 DIAGNOSIS — I10 ESSENTIAL HYPERTENSION: Primary | ICD-10-CM

## 2019-10-29 DIAGNOSIS — G47.33 OBSTRUCTIVE SLEEP APNEA ON CPAP: ICD-10-CM

## 2019-10-29 DIAGNOSIS — E66.01 MORBID OBESITY WITH BMI OF 50.0-59.9, ADULT (HCC): ICD-10-CM

## 2019-10-29 DIAGNOSIS — Z99.89 OBSTRUCTIVE SLEEP APNEA ON CPAP: ICD-10-CM

## 2019-10-29 DIAGNOSIS — Z72.0 TOBACCO ABUSE: ICD-10-CM

## 2019-10-29 DIAGNOSIS — M1A.0790 IDIOPATHIC CHRONIC GOUT OF FOOT WITHOUT TOPHUS, UNSPECIFIED LATERALITY: ICD-10-CM

## 2019-10-29 PROCEDURE — G8417 CALC BMI ABV UP PARAM F/U: HCPCS | Performed by: FAMILY MEDICINE

## 2019-10-29 PROCEDURE — 4004F PT TOBACCO SCREEN RCVD TLK: CPT | Performed by: FAMILY MEDICINE

## 2019-10-29 PROCEDURE — 99214 OFFICE O/P EST MOD 30 MIN: CPT | Performed by: FAMILY MEDICINE

## 2019-10-29 PROCEDURE — G8484 FLU IMMUNIZE NO ADMIN: HCPCS | Performed by: FAMILY MEDICINE

## 2019-10-29 PROCEDURE — G8427 DOCREV CUR MEDS BY ELIG CLIN: HCPCS | Performed by: FAMILY MEDICINE

## 2020-03-02 RX ORDER — ATORVASTATIN CALCIUM 20 MG/1
TABLET, FILM COATED ORAL
Qty: 30 TABLET | Refills: 11 | Status: SHIPPED | OUTPATIENT
Start: 2020-03-02 | End: 2020-03-02 | Stop reason: SDUPTHER

## 2020-03-02 RX ORDER — HYDROCHLOROTHIAZIDE 25 MG/1
TABLET ORAL
Qty: 30 TABLET | Refills: 11 | Status: SHIPPED | OUTPATIENT
Start: 2020-03-02 | End: 2021-03-02

## 2020-03-02 RX ORDER — ATORVASTATIN CALCIUM 20 MG/1
TABLET, FILM COATED ORAL
Qty: 30 TABLET | Refills: 11 | Status: SHIPPED | OUTPATIENT
Start: 2020-03-02 | End: 2021-03-11 | Stop reason: SDUPTHER

## 2020-03-02 RX ORDER — ALLOPURINOL 100 MG/1
TABLET ORAL
Qty: 30 TABLET | Refills: 11 | Status: SHIPPED | OUTPATIENT
Start: 2020-03-02 | End: 2021-03-02

## 2020-03-13 RX ORDER — LOSARTAN POTASSIUM 50 MG/1
TABLET ORAL
Qty: 60 TABLET | Refills: 11 | Status: SHIPPED | OUTPATIENT
Start: 2020-03-13 | End: 2021-03-11 | Stop reason: SDUPTHER

## 2020-03-18 ENCOUNTER — APPOINTMENT (OUTPATIENT)
Dept: INTERVENTIONAL RADIOLOGY/VASCULAR | Age: 45
End: 2020-03-18
Payer: COMMERCIAL

## 2020-03-18 ENCOUNTER — HOSPITAL ENCOUNTER (EMERGENCY)
Age: 45
Discharge: HOME | End: 2020-03-18
Payer: COMMERCIAL

## 2020-03-18 VITALS
OXYGEN SATURATION: 100 % | TEMPERATURE: 97.7 F | WEIGHT: 315 LBS | RESPIRATION RATE: 19 BRPM | HEART RATE: 102 BPM | BODY MASS INDEX: 40.43 KG/M2 | DIASTOLIC BLOOD PRESSURE: 83 MMHG | HEIGHT: 74 IN | SYSTOLIC BLOOD PRESSURE: 157 MMHG

## 2020-03-18 LAB
ALBUMIN SERPL-MCNC: 4.6 G/DL (ref 3.5–5.1)
ALP BLD-CCNC: 74 U/L (ref 38–126)
ALT SERPL-CCNC: 28 U/L (ref 11–66)
ANION GAP SERPL CALCULATED.3IONS-SCNC: 15 MEQ/L (ref 8–16)
AST SERPL-CCNC: 22 U/L (ref 5–40)
BASOPHILS # BLD: 0.7 %
BASOPHILS ABSOLUTE: 0.1 THOU/MM3 (ref 0–0.1)
BILIRUB SERPL-MCNC: 0.4 MG/DL (ref 0.3–1.2)
BUN BLDV-MCNC: 16 MG/DL (ref 7–22)
CALCIUM SERPL-MCNC: 9.5 MG/DL (ref 8.5–10.5)
CHLORIDE BLD-SCNC: 103 MEQ/L (ref 98–111)
CO2: 26 MEQ/L (ref 23–33)
CREAT SERPL-MCNC: 0.8 MG/DL (ref 0.4–1.2)
EOSINOPHIL # BLD: 4 %
EOSINOPHILS ABSOLUTE: 0.3 THOU/MM3 (ref 0–0.4)
ERYTHROCYTE [DISTWIDTH] IN BLOOD BY AUTOMATED COUNT: 13 % (ref 11.5–14.5)
ERYTHROCYTE [DISTWIDTH] IN BLOOD BY AUTOMATED COUNT: 46.3 FL (ref 35–45)
GFR SERPL CREATININE-BSD FRML MDRD: > 90 ML/MIN/1.73M2
GLUCOSE BLD-MCNC: 105 MG/DL (ref 70–108)
HCT VFR BLD CALC: 45.2 % (ref 42–52)
HEMOGLOBIN: 14.8 GM/DL (ref 14–18)
IMMATURE GRANS (ABS): 0.03 THOU/MM3 (ref 0–0.07)
IMMATURE GRANULOCYTES: 0.4 %
LYMPHOCYTES # BLD: 20.5 %
LYMPHOCYTES ABSOLUTE: 1.7 THOU/MM3 (ref 1–4.8)
MCH RBC QN AUTO: 31.6 PG (ref 26–33)
MCHC RBC AUTO-ENTMCNC: 32.7 GM/DL (ref 32.2–35.5)
MCV RBC AUTO: 96.4 FL (ref 80–94)
MONOCYTES # BLD: 8.6 %
MONOCYTES ABSOLUTE: 0.7 THOU/MM3 (ref 0.4–1.3)
NUCLEATED RED BLOOD CELLS: 0 /100 WBC
OSMOLALITY CALCULATION: 288.4 MOSMOL/KG (ref 275–300)
PLATELET # BLD: 169 THOU/MM3 (ref 130–400)
PMV BLD AUTO: 9.9 FL (ref 9.4–12.4)
POTASSIUM SERPL-SCNC: 3.9 MEQ/L (ref 3.5–5.2)
RBC # BLD: 4.69 MILL/MM3 (ref 4.7–6.1)
SEG NEUTROPHILS: 65.8 %
SEGMENTED NEUTROPHILS ABSOLUTE COUNT: 5.5 THOU/MM3 (ref 1.8–7.7)
SODIUM BLD-SCNC: 144 MEQ/L (ref 135–145)
TOTAL PROTEIN: 7.4 G/DL (ref 6.1–8)
URIC ACID: 6.7 MG/DL (ref 3.7–7)
WBC # BLD: 8.3 THOU/MM3 (ref 4.8–10.8)

## 2020-03-18 PROCEDURE — 80053 COMPREHEN METABOLIC PANEL: CPT

## 2020-03-18 PROCEDURE — 93971 EXTREMITY STUDY: CPT

## 2020-03-18 PROCEDURE — 36415 COLL VENOUS BLD VENIPUNCTURE: CPT

## 2020-03-18 PROCEDURE — 99281 EMR DPT VST MAYX REQ PHY/QHP: CPT

## 2020-03-18 PROCEDURE — 84550 ASSAY OF BLOOD/URIC ACID: CPT

## 2020-03-18 PROCEDURE — 85025 COMPLETE CBC W/AUTO DIFF WBC: CPT

## 2020-03-18 RX ORDER — SULFAMETHOXAZOLE AND TRIMETHOPRIM 800; 160 MG/1; MG/1
1 TABLET ORAL 2 TIMES DAILY
Qty: 20 TABLET | Refills: 0 | Status: SHIPPED | OUTPATIENT
Start: 2020-03-18 | End: 2020-03-28

## 2020-03-18 RX ORDER — CEPHALEXIN 500 MG/1
500 CAPSULE ORAL 3 TIMES DAILY
Qty: 30 CAPSULE | Refills: 0 | Status: SHIPPED | OUTPATIENT
Start: 2020-03-18 | End: 2020-03-28

## 2020-03-18 RX ORDER — IBUPROFEN 600 MG/1
600 TABLET ORAL EVERY 6 HOURS PRN
Qty: 30 TABLET | Refills: 0 | Status: SHIPPED | OUTPATIENT
Start: 2020-03-18

## 2020-03-18 ASSESSMENT — ENCOUNTER SYMPTOMS
NAUSEA: 0
VOMITING: 0
SHORTNESS OF BREATH: 0
DIARRHEA: 0
COLOR CHANGE: 1
CONSTIPATION: 0
COUGH: 0
BACK PAIN: 0
ABDOMINAL PAIN: 0

## 2020-03-18 ASSESSMENT — PAIN DESCRIPTION - PAIN TYPE: TYPE: ACUTE PAIN

## 2020-03-18 ASSESSMENT — PAIN DESCRIPTION - LOCATION: LOCATION: LEG

## 2020-03-18 ASSESSMENT — PAIN DESCRIPTION - FREQUENCY: FREQUENCY: INTERMITTENT

## 2020-03-18 ASSESSMENT — PAIN DESCRIPTION - ONSET: ONSET: OTHER (COMMENT)

## 2020-03-18 ASSESSMENT — PAIN SCALES - GENERAL: PAINLEVEL_OUTOF10: 2

## 2020-03-18 ASSESSMENT — PAIN DESCRIPTION - ORIENTATION: ORIENTATION: RIGHT

## 2020-03-18 ASSESSMENT — PAIN DESCRIPTION - DESCRIPTORS: DESCRIPTORS: PRESSURE;SQUEEZING

## 2020-03-18 NOTE — ED PROVIDER NOTES
[facility] EMERGENCY DEPT      CHIEF COMPLAINT       Chief Complaint   Patient presents with    Leg Pain       Nurses Notes reviewed and I agree except asnoted in the HPI. HISTORY OFPRESENT ILLNESS    Eula Morin is a 40 y.o. male who presents to the emergency department for evaluation of pain, swelling, and redness behind the right knee. The patient reports that yesterday, he was getting out of the shower and noticed that he had an area of redness and pain behind his right knee. He reports mild swelling in this area as well. The patient reports that he feels a pressure sensation behind his right knee whenever he walks. He also reports increasing pain with walking or weightbearing. The patient reports that approximately 5 or 6 months ago, he started a new job requiring much more walking than usual.  The patient also reports that he has been trying to walk more at home in order to lose weight and be more active. The patient otherwise denies any injuries of the right lower extremity. The patient reports occasional cramping pain in the back of his right calf, but he otherwise denies any pain or swelling of the right calf. The patient denies any weakness but does report experiencing possible numbness and tingling in the right foot on the way to this department. The patient denies any fevers, chills, chest pain, shortness of breath, coughing, abdominal pain, nausea, or vomiting. The patient reports that he recently went to [facility], which required a 2-hour drive. He otherwise denies any recent travel. He denies any recent immobilization or hospitalization. The patient denies a history of blood clots. The patient has a history of hypertension, hyperlipidemia, gout, and GERD. Patient reports that he has had cellulitis in his left leg in the past. The patient has no additional complaints at this time.     REVIEW OF SYSTEMS      Review of Systems   Constitutional: Negative for chills, fatigue and mother is alive. He indicated that his father is . He indicated that the status of his maternal grandmother is unknown. He indicated that the status of his paternal aunt is unknown. [unfilled]    SOCIAL HISTORY      reports that he has been smoking cigarettes. He started smoking about 26 years ago. He has a 25.00 pack-year smoking history. He has never used smokeless tobacco. He reports current alcohol use of about 12.0 standard drinks of alcohol per week. He reports that he does not use drugs. PHYSICAL EXAM     INITIAL VITALS:  height is 6' 2\" (1.88 m) and weight is 395 lb (179.2 kg) (abnormal). His oral temperature is 97.7 °F (36.5 °C). His blood pressure is 157/83 (abnormal) and his pulse is 102. His respiration is 19 and oxygen saturation is 100%. Physical Exam  Vitals signs and nursing note reviewed. Constitutional:       General: He is not in acute distress. Appearance: Normal appearance. He is well-developed. He is obese. He is not ill-appearing, toxic-appearing or diaphoretic. HENT:      Head: Normocephalic and atraumatic. Right Ear: External ear normal.      Left Ear: External ear normal.      Nose: Nose normal.      Mouth/Throat:      Mouth: Mucous membranes are moist.      Pharynx: Oropharynx is clear. Eyes:      General: No scleral icterus. Right eye: No discharge. Left eye: No discharge. Conjunctiva/sclera: Conjunctivae normal.      Pupils: Pupils are equal, round, and reactive to light. Neck:      Musculoskeletal: Normal range of motion. Cardiovascular:      Rate and Rhythm: Normal rate and regular rhythm. Pulses: Normal pulses. Dorsalis pedis pulses are 2+ on the right side. Posterior tibial pulses are 2+ on the right side. Heart sounds: Normal heart sounds. No murmur. No friction rub. No gallop. Comments: Capillary refill is less than 3 seconds.   Pulmonary:      Effort: Pulmonary effort is normal. No respiratory GCS motor subscore is 6. Sensory: No sensory deficit. Motor: No weakness or abnormal muscle tone. Coordination: Coordination normal.      Gait: Gait normal.   Psychiatric:         Behavior: Behavior normal.         Thought Content: Thought content normal.                    DIFFERENTIAL DIAGNOSIS:   Includes but not limited to: Cellulitis, DVT, Baker's cyst, gout, muscle strain versus sprain versus spasm    DIAGNOSTIC RESULTS     EKG: All EKG's are interpreted by the Emergency Department Physician who either signs or Co-signsthis chart in the absence of a cardiologist.  None    RADIOLOGY: non-plain film images(s) such as CT, Ultrasound and MRI are read by the radiologist.  Plainradiographic images are visualized and preliminarily interpreted by the emergency physician unless otherwise stated below. VL DUP LOWER EXTREMITY VENOUS RIGHT   Final Result   No evidence of right lower extremity DVT. **This report has been created using voice recognition software. It may contain minor errors which are inherent in voice recognition technology. **      Final report electronically signed by Dr. Daron Olivier on 3/18/2020 5:49 PM          LABS:   Labs Reviewed   CBC WITH AUTO DIFFERENTIAL - Abnormal; Notable for the following components:       Result Value    RBC 4.69 (*)     MCV 96.4 (*)     RDW-SD 46.3 (*)     All other components within normal limits   COMPREHENSIVE METABOLIC PANEL   URIC ACID   ANION GAP   OSMOLALITY   GLOMERULAR FILTRATION RATE, ESTIMATED       EMERGENCY DEPARTMENT COURSE:   Vitals:    Vitals:    03/18/20 1654   BP: (!) 157/83   Pulse: 102   Resp: 19   Temp: 97.7 °F (36.5 °C)   TempSrc: Oral   SpO2: 100%   Weight: (!) 395 lb (179.2 kg)   Height: 6' 2\" (1.88 m)     The patient was seen and evaluated within the ED today with pain, swelling, and redness behind the right knee. Within the department, I observed the patient's vital signs to be within acceptable range, and he was afebrile. On exam, I appreciated clear lung sounds. There is mild edema, erythema, and warmth behind the patient's right knee in the popliteal fossa with mild associated tenderness. There is no tenderness, edema, erythema, or warmth of the right calf or right knee. Patient has full ROM and strength of the RLE. There is intact sensation of soft touch in the RLE. The RLE appears to be neurovascularly intact. US studies within the department revealed no DVT of the right lower extremity. Laboratory work revealed a WBC count and uric acid within normal range. I observed the patient's condition to remain stable during the duration of the stay. The erythema behind the patient's right knee was marked with a skin marker. We will place the patient on antibiotics and treat as a mild cellulitis. Return precautions were given. I explained my proposed course of treatment to the patient, who was amenable to my treatment and discharge decisions. The patient was discharged home in stable condition with prescriptions for ibuprofen, Bactrim, and Keflex, and the patient will return to the ED if the symptoms become more severe in nature or otherwise change. CRITICAL CARE:   None    CONSULTS:  None    PROCEDURES:  None    FINAL IMPRESSION      1. Popliteal pain    2. Pain and swelling of right knee    3. Cellulitis of right lower extremity          DISPOSITION/PLAN     1. Popliteal pain    2. Pain and swelling of right knee    3. Cellulitis of right lower extremity       I have given the patient strict written and verbal instructions about care at home, follow-up, and signs and symptoms of worsening of condition, and the patient did verbalize understanding of these instructions. Patient was discharged in stable condition. Will return if symptoms change or worsen, or for any sign or symptom deemed emergent by the patient or family members. Follow up as an outpatient or sooner if symptoms warrant.      PATIENT REFERRED TO:  Faustino Maloney

## 2020-03-18 NOTE — ED NOTES
Pt presents to ED for right leg pain. Pt pain is located behind the right knee. Pt states the pain started yesterday. Pt describes the pain as a \"pressure\" or \"swelling\" like feeling. Swelling, redness, and warmth is noted at pain site. Pt states the pain gets worse when he is walking or flexing his foot.       Leslie Mistry  03/18/20 7103

## 2020-04-23 ENCOUNTER — TELEPHONE (OUTPATIENT)
Dept: PRIMARY CARE CLINIC | Age: 45
End: 2020-04-23

## 2020-04-27 ENCOUNTER — TELEMEDICINE (OUTPATIENT)
Dept: PULMONOLOGY | Age: 45
End: 2020-04-27
Payer: COMMERCIAL

## 2020-04-27 PROCEDURE — G8417 CALC BMI ABV UP PARAM F/U: HCPCS | Performed by: PHYSICIAN ASSISTANT

## 2020-04-27 PROCEDURE — G8428 CUR MEDS NOT DOCUMENT: HCPCS | Performed by: PHYSICIAN ASSISTANT

## 2020-04-27 PROCEDURE — 4004F PT TOBACCO SCREEN RCVD TLK: CPT | Performed by: PHYSICIAN ASSISTANT

## 2020-04-27 PROCEDURE — 99213 OFFICE O/P EST LOW 20 MIN: CPT | Performed by: PHYSICIAN ASSISTANT

## 2020-04-27 ASSESSMENT — ENCOUNTER SYMPTOMS
ALLERGIC/IMMUNOLOGIC NEGATIVE: 1
STRIDOR: 0
COUGH: 0
NAUSEA: 0
EYES NEGATIVE: 1
SHORTNESS OF BREATH: 0
CHEST TIGHTNESS: 0
WHEEZING: 0
DIARRHEA: 0
BACK PAIN: 0

## 2020-04-27 NOTE — PROGRESS NOTES
pressure. - He  advised to keep good compliance with current recommended pressure to get optimal results and clinical improvement  - Recommend 7-9 hours of sleep with PAP  - He was advised to call [employer] company regarding supplies if needed.   -He call my office for earlier appointment if needed for worsening of sleep symptoms.   - He was instructed on weight loss  - Dennis Terry was educated about my impression and plan. Patient verbalizesunderstanding. We will see Mathieu Davila back in: 1 year with download    Information added by my medical assistant/LPN was reviewed today          ANAT Hou  4/27/2020        Dennis Terry is being evaluated by a Virtual Visit (video visit) encounter to address concerns as mentioned above. A caregiver was present when appropriate. Due to this being a TeleHealth encounter (During JSBND-44 public health emergency), evaluation of the following organ systems was limited: Vitals/Constitutional/EENT/Resp/CV/GI//MS/Neuro/Skin/Heme-Lymph-Imm. Pursuant to the emergency declaration under the [address] [place] [address], [address] [place] [address] authority and the [employer] Act, this Virtual Visit was conducted with patient's (and/or legal guardian's) consent, to reduce the patient's risk of exposure to COVID-19 and provide necessary medical care. The patient (and/or legal guardian) has also been advised to contact this office for worsening conditions or problems, and seek emergency medical treatment and/or call 911 if deemed necessary. Services were provided through a video synchronous discussion virtually to substitute for in-person clinic visit. Patient and provider were located at their individual homes. Patient identification was verified at the start of the visit.   Total time spent on this encounter was 15 min      Micaela Hou  4/27/2020      An electronic signature was used to authenticate this

## 2020-04-30 ENCOUNTER — TELEMEDICINE (OUTPATIENT)
Dept: FAMILY MEDICINE CLINIC | Age: 45
End: 2020-04-30
Payer: COMMERCIAL

## 2020-04-30 PROCEDURE — G8427 DOCREV CUR MEDS BY ELIG CLIN: HCPCS | Performed by: FAMILY MEDICINE

## 2020-04-30 PROCEDURE — 99214 OFFICE O/P EST MOD 30 MIN: CPT | Performed by: FAMILY MEDICINE

## 2020-05-27 ENCOUNTER — OFFICE VISIT (OUTPATIENT)
Dept: CARDIOLOGY CLINIC | Age: 45
End: 2020-05-27
Payer: COMMERCIAL

## 2020-05-27 VITALS
SYSTOLIC BLOOD PRESSURE: 130 MMHG | WEIGHT: 315 LBS | BODY MASS INDEX: 39.17 KG/M2 | DIASTOLIC BLOOD PRESSURE: 72 MMHG | HEART RATE: 86 BPM | HEIGHT: 75 IN

## 2020-05-27 PROCEDURE — G8417 CALC BMI ABV UP PARAM F/U: HCPCS | Performed by: INTERNAL MEDICINE

## 2020-05-27 PROCEDURE — 93000 ELECTROCARDIOGRAM COMPLETE: CPT | Performed by: INTERNAL MEDICINE

## 2020-05-27 PROCEDURE — G8427 DOCREV CUR MEDS BY ELIG CLIN: HCPCS | Performed by: INTERNAL MEDICINE

## 2020-05-27 PROCEDURE — 99213 OFFICE O/P EST LOW 20 MIN: CPT | Performed by: INTERNAL MEDICINE

## 2020-05-27 PROCEDURE — 4004F PT TOBACCO SCREEN RCVD TLK: CPT | Performed by: INTERNAL MEDICINE

## 2020-05-27 NOTE — PROGRESS NOTES
[address] [facility]  [address]  Dept: [phone]  Dept Fax: [phone]: [phone]    Visit Date: 5/27/2020    Mr. David Aquino is a 40 y.o. male  who presented for:  Chief Complaint   Patient presents with    Check-Up    Hypertension       HPI:   41 yo M c hx HTN, HLD, Smokes (15cigs per day), BHARATHI on CPAP is here for a follow up. Patient has previously seen Dr. Corina Gonzalez in the past.   Denies any chest pain,no radiating, no aggravating or alleviating factors, not associated sob, palpitations, lightheadedness, dizziness, orthopnea, PND or pedal edema.                Current Outpatient Medications:     ibuprofen (ADVIL;MOTRIN) 600 MG tablet, Take 1 tablet by mouth every 6 hours as needed for Pain, Disp: 30 tablet, Rfl: 0    losartan (COZAAR) 50 MG tablet, TAKE ONE TABLET BY MOUTH TWICE A DAY, Disp: 60 tablet, Rfl: 11    hydroCHLOROthiazide (HYDRODIURIL) 25 MG tablet, TAKE ONE TABLET BY MOUTH EVERY DAY, Disp: 30 tablet, Rfl: 11    atorvastatin (LIPITOR) 20 MG tablet, 1 po  qd, Disp: 30 tablet, Rfl: 11    allopurinol (ZYLOPRIM) 100 MG tablet, TAKE ONE TABLET BY MOUTH EVERY DAY, Disp: 30 tablet, Rfl: 11    Esomeprazole Magnesium (NEXIUM PO), Take by mouth daily, Disp: , Rfl:     ZINC PO, Take by mouth, Disp: , Rfl:     Past Medical History  Dayan Godinez  has a past medical history of Benign intracranial hypertension, Dysmetabolic syndrome, Family history of premature ventricular contractions, GERD (gastroesophageal reflux disease), Gout, Hemorrhoids, History of morbid obesity, Hyperlipemia, BHARATHI on CPAP, Pseudotumor cerebri, Second opinion for hypertension, and Tobacco abuse. Social History  Dayan Godinez  reports that he has been smoking cigarettes. He started smoking about 26 years ago. He has a 25.00 pack-year smoking history. He has never used smokeless tobacco. He reports current alcohol use of about 12.0 standard drinks of alcohol per week.  He light.   Neck: Normal range of motion. Neck supple. No JVD present. Cardiovascular: Normal rate , normal heart sounds and intact distal pulses. Pulmonary/Chest: Effort normal and breath sounds normal. No respiratory distress. No wheezes. No rales. Abdominal: Soft. Bowel sounds are normal. No distension. There is no tenderness. Musculoskeletal: Normal range of motion. No edema. Neurological: Alert and oriented to person, place, and time. No cranial nerve deficit. Coordination normal.   Skin: Skin is warm and dry. Psychiatric: Normal mood and affect.        No results found for: CKTOTAL, CKMB, CKMBINDEX    Lab Results   Component Value Date    WBC 8.3 03/18/2020    RBC 4.69 03/18/2020    HGB 14.8 03/18/2020    HCT 45.2 03/18/2020    MCV 96.4 03/18/2020    MCH 31.6 03/18/2020    MCHC 32.7 03/18/2020    RDW 12.8 04/28/2018     03/18/2020    MPV 9.9 03/18/2020       Lab Results   Component Value Date     03/18/2020    K 3.9 03/18/2020     03/18/2020    CO2 26 03/18/2020    BUN 16 03/18/2020    LABALBU 4.6 03/18/2020    CREATININE 0.8 03/18/2020    CALCIUM 9.5 03/18/2020    LABGLOM >90 03/18/2020    GLUCOSE 105 03/18/2020       Lab Results   Component Value Date    ALKPHOS 74 03/18/2020    ALT 28 03/18/2020    AST 22 03/18/2020    PROT 7.4 03/18/2020    BILITOT 0.4 03/18/2020    BILIDIR <0.2 07/22/2015    LABALBU 4.6 03/18/2020       No results found for: MG    No results found for: INR, PROTIME      Lab Results   Component Value Date    LABA1C 4.7 05/28/2019       Lab Results   Component Value Date    TRIG 131 05/28/2019    HDL 38 05/28/2019    LDLCALC 42 05/28/2019       Lab Results   Component Value Date    TSH 1.710 05/28/2019         Testing Reviewed:      I haveindividually reviewed the below cardiac tests    EKG:    ECHO:   Results for orders placed during the hospital encounter of 03/10/17   ECHO Complete 2D W Doppler W Color    Narrative Transthoracic Echocardiography Report Tricuspid Valve   Tricuspid valve is structurally normal.   Tricuspid valve was not well visualized. trivial tricuspid regurgitation. Pulmonic Valve   Pulmonic valve is structurally normal.      Left Atrium   top Normal size left atrium. Left Ventricle   Systolic function was normal.   Ejection fraction is visually estimated at 55%. The left ventricle was not well visualized. Doppler parameters were consistent with abnormal left ventricular   relaxation (grade 1 diastolic dysfunction). Right Atrium   The right atrium is of normal size. Right Ventricle   Normal right ventricular size and function. Pericardial Effusion   No evidence of any pericardial effusion.      M-Mode/2D Measurements & Calculations      LV Diastolic    LV Systolic Dimension: 3.7   AV Cusp Separation: 2.5 cmLA   Dimension: 5.1  cm                           Dimension: 4 cmAO Root   cm              LV Volume Diastolic: 601 ml  Dimension: 3.8 cm   LV FS:27.5 %    LV Volume Systolic: 53.1 ml   LV PW           LV EDV/LV EDV Index: 701   Diastolic: 1 cm XG/58 U^2RE ESV/LV ESV   Septum          Index: 58.1 ml/20 m^2        RV Diastolic Dimension: 2.8   Diastolic: 0.9  EF Calculated: 53.2 %        cm   cm                                                LA/Aorta: 1.05     Doppler Measurements & Calculations      MV Peak E-Wave: 104 cm/s   AV Peak Velocity: 147 LVOT Peak Velocity: 129   MV Peak A-Wave: 72.1 cm/s  cm/s                  cm/s   MV E/A Ratio: 1.44         AV Peak Gradient:     LVOT Peak Gradient: 7   MV Peak Gradient: 4.33     8.64 mmHg             mmHg   mmHg                                                    TV Peak E-Wave: 68.1 cm/s   MV Deceleration Time: 232                        TV Peak A-Wave: 45.9 cm/s   msec                                                    TV Peak Gradient: 1.86                                                    mmHg   MV E' Septal Velocity:                           TR Velocity:198 cm/s   8.68 cm/s                  AV DVI (Vmax):0.88    TR Gradient:15.68 mmHg   MV A' Septal Velocity:                           PV Peak Velocity: 66.1   9.07 cm/s                                        cm/s   MV E' Lateral Velocity:                          PV Peak Gradient: 1.75   10.6 cm/s                                        mmHg   MV A' Lateral Velocity:   10.6 cm/s   E/E' septal: 11.98   E/E' lateral: 9.81   MR Velocity: 330 cm/s     [email or web address] [id number]       STRESS:    CATH:    Assessment/Plan       Diagnosis Orders   1. Essential hypertension  EKG 12 Lead   2. Heart palpitations  EKG 12 Lead     ASCVD  BHARATHI on CPAP  Smoker  HTN     Reviewed EKG and prior workup  No symptoms at presents  Advised to loose weigth  BP controlled  Continue Losartan, HCTZ, lipitor  The patient is asked to make an attempt to improve diet and exercise patterns to aid in medical management of this problem. Smoking: discussed with the patient the importance of smoke cessation especially with the risk of CAD. Advised patient to call office or seek immediate medical attention if there is any new onset of  any chest pain, sob, palpitations, lightheadedness, dizziness, orthopnea, PND or pedal edema. Thank youfor allowing me to participate in the care of this patient. Please do not hesitate to contact me for any further questions. Return in about 1 year (around 5/27/2021) for Regular follow up, Review testing.        Electronically signed by Joie Nguyen MD [facility]  5/27/2020 at 3:11 PM

## 2020-05-27 NOTE — PROGRESS NOTES
Pt here for 1 yr check up    Pt denies chest pain, dizziness, sob,     Pt continues with heart palpitations, swelling in left leg at times     EKG done today

## 2020-06-19 ENCOUNTER — HOSPITAL ENCOUNTER (EMERGENCY)
Age: 45
Discharge: HOME | End: 2020-06-19
Attending: EMERGENCY MEDICINE
Payer: COMMERCIAL

## 2020-06-19 VITALS
TEMPERATURE: 97.8 F | SYSTOLIC BLOOD PRESSURE: 152 MMHG | HEART RATE: 88 BPM | RESPIRATION RATE: 18 BRPM | DIASTOLIC BLOOD PRESSURE: 78 MMHG | OXYGEN SATURATION: 98 %

## 2020-06-19 PROCEDURE — 99282 EMERGENCY DEPT VISIT SF MDM: CPT

## 2020-06-19 RX ORDER — SULFAMETHOXAZOLE AND TRIMETHOPRIM 800; 160 MG/1; MG/1
1 TABLET ORAL 2 TIMES DAILY
Qty: 14 TABLET | Refills: 0 | Status: SHIPPED | OUTPATIENT
Start: 2020-06-19 | End: 2020-06-26

## 2020-06-19 RX ORDER — CEPHALEXIN 500 MG/1
500 CAPSULE ORAL 4 TIMES DAILY
Qty: 28 CAPSULE | Refills: 0 | Status: SHIPPED | OUTPATIENT
Start: 2020-06-19 | End: 2021-03-11 | Stop reason: ALTCHOICE

## 2020-06-19 ASSESSMENT — PAIN SCALES - GENERAL: PAINLEVEL_OUTOF10: 2

## 2020-06-19 NOTE — ED PROVIDER NOTES
[address]  [address] [facility] [address]  Phone: [phone]    Emergency Department encounter      Lisseth Parrish    Chief Complaint   Patient presents with    Facial Swelling     left side       HPI    Mariel Felty is a 40 y.o. male who presents   above-noted complaint. Patient is been doing fine. No major issues he did get some sunburn or windburn recently had some dry skin and was picking at it. Subsequently developed some facial redness and some pain. Now his left cheek is swollen and in front of his right ear is swollen. This ear area hurts too    PAST MEDICAL HISTORY    Past Medical History:   Diagnosis Date    Benign intracranial hypertension 3/61/0900    Dysmetabolic syndrome     Family history of premature ventricular contractions     GERD (gastroesophageal reflux disease)     Gout     Hemorrhoids     History of morbid obesity     Hyperlipemia     BHARATHI on CPAP     Pseudotumor cerebri     Dr Lucio Bruce Second opinion for hypertension     Tobacco abuse        SURGICAL HISTORY    Past Surgical History:   Procedure Laterality Date    CARDIOVASCULAR STRESS TEST  5 29 2007    Preserved LV function and estimated EF of 65%.  FINGER SURGERY      HAND TENDON SURGERY      TRANSTHORACIC ECHOCARDIOGRAM  1 11 2011    EF 55-65% with macrocytic LVH, stage I dysfunction, left atrium mildly dilated probably secondary to diastolic dysfunction. Demonstrated right ventricle markedly dilated.         CURRENT MEDICATIONS    Current Outpatient Rx   Medication Sig Dispense Refill    sulfamethoxazole-trimethoprim (BACTRIM DS) 800-160 MG per tablet Take 1 tablet by mouth 2 times daily for 7 days 14 tablet 0    cephALEXin (KEFLEX) 500 MG capsule Take 1 capsule by mouth 4 times daily 28 capsule 0    ibuprofen (ADVIL;MOTRIN) 600 MG tablet Take 1 tablet by mouth every 6 hours as needed for Pain 30 tablet 0    losartan (COZAAR) 50 MG tablet TAKE ONE TABLET BY

## 2020-06-19 NOTE — ED NOTES
Discharge instructions reviewed with patient and questions answered. Skin warm and dry, color normal for ethnicity. Remains alert and cooperative. Denies further questions or concerns at this time.      Luisa Ramirez RN  06/19/20 7466

## 2020-06-19 NOTE — ED NOTES
Presents ambulatory with complaints of red and swelling of the left side of his face. Patient states that the area started several days ago as a small pimple-like lesion. Today noted swelling of the left cheek to the chin and extends behind the left ear. Area is red, tender to touch. Several blisters remain over the area of the left cheek bone. Patient is alert and cooperative. Skin warm and dry. Color normal for ethnicity.      Mir Bryant RN  06/19/20 1847

## 2020-06-22 ENCOUNTER — VIRTUAL VISIT (OUTPATIENT)
Dept: FAMILY MEDICINE CLINIC | Age: 45
End: 2020-06-22
Payer: COMMERCIAL

## 2020-06-22 ENCOUNTER — TELEPHONE (OUTPATIENT)
Dept: FAMILY MEDICINE CLINIC | Age: 45
End: 2020-06-22

## 2020-06-22 PROCEDURE — 99213 OFFICE O/P EST LOW 20 MIN: CPT | Performed by: FAMILY MEDICINE

## 2020-06-22 PROCEDURE — G8427 DOCREV CUR MEDS BY ELIG CLIN: HCPCS | Performed by: FAMILY MEDICINE

## 2020-06-22 RX ORDER — TADALAFIL 10 MG/1
10 TABLET ORAL DAILY PRN
Qty: 30 TABLET | Refills: 1 | Status: SHIPPED | OUTPATIENT
Start: 2020-06-22 | End: 2021-06-02

## 2020-06-22 NOTE — PROGRESS NOTES
TABLET BY MOUTH TWICE A DAY  Nadia Mittal MD   hydroCHLOROthiazide (HYDRODIURIL) 25 MG tablet TAKE ONE TABLET BY MOUTH EVERY DAY  Nadia Mittal MD   atorvastatin (LIPITOR) 20 MG tablet 1 po  qd  Nadia Mittal MD   allopurinol (ZYLOPRIM) 100 MG tablet TAKE ONE TABLET BY MOUTH EVERY DAY  Nadia Mittal MD   Esomeprazole Magnesium (NEXIUM PO) Take by mouth daily  Historical Provider, MD   ZINC PO Take by mouth  Historical Provider, MD       Social History     Tobacco Use    Smoking status: Current Every Day Smoker     Packs/day: 1.00     Years: 25.00     Pack years: 25.00     Types: Cigarettes     Start date: 12    Smokeless tobacco: Never Used    Tobacco comment: smoking . 75 ppd 3-7-19. Substance Use Topics    Alcohol use: Yes     Alcohol/week: 12.0 standard drinks     Types: 12 Cans of beer per week     Comment: occasional    Drug use: No          PHYSICAL EXAMINATION:  [ INSTRUCTIONS:  \"[x]\" Indicates a positive item  \"[]\" Indicates a negative item  -- DELETE ALL ITEMS NOT EXAMINED]  Vital Signs: (As obtained by patient/caregiver or practitioner observation)    Blood pressure-  Heart rate-    Respiratory rate-    Temperature-  Pulse oximetry-     Constitutional: [x] Appears well-developed and well-nourished [x] No apparent distress      [] Abnormal-   Mental status  [x] Alert and awake  [x] Oriented to person/place/time [x]Able to follow commands      Eyes:  EOM    [x]  Normal  [] Abnormal-  Sclera  [x]  Normal  [] Abnormal -         Discharge [x]  None visible  [] Abnormal -    HENT:   [x] Normocephalic, atraumatic.   [] Abnormal   [x] Mouth/Throat: Mucous membranes are moist.     External Ears [x] Normal  [] Abnormal-     Neck: [x] No visualized mass     Pulmonary/Chest: [x] Respiratory effort normal.  [x] No visualized signs of difficulty breathing or respiratory distress        [] Abnormal-      Musculoskeletal:   [x] Normal gait with no signs of ataxia         [x] Normal range of motion of neck

## 2020-09-10 ENCOUNTER — OFFICE VISIT (OUTPATIENT)
Dept: FAMILY MEDICINE CLINIC | Age: 45
End: 2020-09-10
Payer: COMMERCIAL

## 2020-09-10 VITALS
HEART RATE: 86 BPM | SYSTOLIC BLOOD PRESSURE: 134 MMHG | WEIGHT: 315 LBS | TEMPERATURE: 98.4 F | DIASTOLIC BLOOD PRESSURE: 80 MMHG | BODY MASS INDEX: 53.12 KG/M2

## 2020-09-10 PROCEDURE — 99214 OFFICE O/P EST MOD 30 MIN: CPT | Performed by: FAMILY MEDICINE

## 2020-09-10 PROCEDURE — G8427 DOCREV CUR MEDS BY ELIG CLIN: HCPCS | Performed by: FAMILY MEDICINE

## 2020-09-10 PROCEDURE — 4004F PT TOBACCO SCREEN RCVD TLK: CPT | Performed by: FAMILY MEDICINE

## 2020-09-10 PROCEDURE — G8417 CALC BMI ABV UP PARAM F/U: HCPCS | Performed by: FAMILY MEDICINE

## 2020-09-10 NOTE — PROGRESS NOTES
normal. No drainage or tenderness. Left Ear: Tympanic membrane, external ear and ear canal normal. No drainage or tenderness. Nose: Nose normal. No mucosal edema or rhinorrhea. Mouth/Throat: Uvula is midline, oropharynx is clear and moist and mucous membranes are normal. Mucous membranes are not pale. Normal dentition. No posterior oropharyngeal edema or posterior oropharyngeal erythema. Eyes: Lids are normal. Right eye exhibits no chemosis and no discharge. Left eye exhibits no chemosis and no drainage. Right conjunctiva has no hemorrhage. Left conjunctiva has no hemorrhage. Right eye exhibits normal extraocular motion. Left eye exhibits normal extraocular motion. Right pupil is round and reactive. Left pupil is round and reactive. Pupils are equal.   Cardiovascular: Normal rate, regular rhythm, S1 normal, S2 normal and normal heart sounds. Exam reveals no gallop. No murmur heard. Pulmonary/Chest: Effort normal and breath sounds normal. No respiratory distress. He has no wheezes. He has no rhonchi. He has no rales. Abdominal: Soft. Normal appearance and bowel sounds are normal. He exhibits no distension and no mass. There is no hepatosplenomegaly. No tenderness. He has no rigidity, no rebound and no guarding. No hernia. Musculoskeletal:        Right lower leg: He exhibits no edema. Left lower leg: He exhibits no edema. Neurological: He is alert. Oriented and pleasent        Assessment/Plan   Nicole Flores was seen today for follow-up.     Diagnoses and all orders for this visit:    Essential hypertension    Pure hypercholesterolemia    Idiopathic chronic gout of foot without tophus, unspecified laterality    Gastroesophageal reflux disease without esophagitis    Other male erectile dysfunction    Obstructive sleep apnea on CPAP    Dysmetabolic syndrome    Morbid obesity with BMI of 50.0-59.9, adult (HCC)    Tobacco abuse      No change to medications   Continue healthy diet and exercise  Aspirin daily    [place] foods  Small meals  No late meals     Discussed use, benefit, and side effectsof prescribed medications. All patient questions answered. Pt voiced understanding. Reviewed health maintenance. Instructed to continue current medications, diet and exercise. Patient agreed with treatment plan. Followup as directed.      Electronically signed by Ladan Smalls MD

## 2020-12-30 ENCOUNTER — NURSE ONLY (OUTPATIENT)
Dept: LAB | Age: 45
End: 2020-12-30

## 2020-12-30 LAB
ALBUMIN SERPL-MCNC: 4.3 G/DL (ref 3.5–5.1)
ALP BLD-CCNC: 66 U/L (ref 38–126)
ALT SERPL-CCNC: 26 U/L (ref 11–66)
ANION GAP SERPL CALCULATED.3IONS-SCNC: 10 MEQ/L (ref 8–16)
AST SERPL-CCNC: 20 U/L (ref 5–40)
AVERAGE GLUCOSE: 87 MG/DL (ref 70–126)
BILIRUB SERPL-MCNC: 0.5 MG/DL (ref 0.3–1.2)
BUN BLDV-MCNC: 15 MG/DL (ref 7–22)
CALCIUM SERPL-MCNC: 9.4 MG/DL (ref 8.5–10.5)
CHLORIDE BLD-SCNC: 104 MEQ/L (ref 98–111)
CHOLESTEROL, TOTAL: 123 MG/DL (ref 100–199)
CO2: 27 MEQ/L (ref 23–33)
CREAT SERPL-MCNC: 0.8 MG/DL (ref 0.4–1.2)
ERYTHROCYTE [DISTWIDTH] IN BLOOD BY AUTOMATED COUNT: 13.2 % (ref 11.5–14.5)
ERYTHROCYTE [DISTWIDTH] IN BLOOD BY AUTOMATED COUNT: 47.5 FL (ref 35–45)
GFR SERPL CREATININE-BSD FRML MDRD: > 90 ML/MIN/1.73M2
GLUCOSE BLD-MCNC: 108 MG/DL (ref 70–108)
HBA1C MFR BLD: 4.9 % (ref 4.4–6.4)
HCT VFR BLD CALC: 44.3 % (ref 42–52)
HDLC SERPL-MCNC: 36 MG/DL
HEMOGLOBIN: 14.4 GM/DL (ref 14–18)
LDL CHOLESTEROL CALCULATED: 57 MG/DL
MCH RBC QN AUTO: 31.7 PG (ref 26–33)
MCHC RBC AUTO-ENTMCNC: 32.5 GM/DL (ref 32.2–35.5)
MCV RBC AUTO: 97.6 FL (ref 80–94)
PLATELET # BLD: 148 THOU/MM3 (ref 130–400)
PMV BLD AUTO: 10.5 FL (ref 9.4–12.4)
POTASSIUM SERPL-SCNC: 4.2 MEQ/L (ref 3.5–5.2)
RBC # BLD: 4.54 MILL/MM3 (ref 4.7–6.1)
SODIUM BLD-SCNC: 141 MEQ/L (ref 135–145)
TOTAL PROTEIN: 7.1 G/DL (ref 6.1–8)
TRIGL SERPL-MCNC: 152 MG/DL (ref 0–199)
TSH SERPL DL<=0.05 MIU/L-ACNC: 1.86 UIU/ML (ref 0.4–4.2)
URIC ACID: 6.3 MG/DL (ref 3.7–7)
WBC # BLD: 5.8 THOU/MM3 (ref 4.8–10.8)

## 2021-03-02 DIAGNOSIS — M1A.0790 IDIOPATHIC CHRONIC GOUT OF FOOT WITHOUT TOPHUS, UNSPECIFIED LATERALITY: ICD-10-CM

## 2021-03-02 DIAGNOSIS — I10 ESSENTIAL HYPERTENSION: ICD-10-CM

## 2021-03-02 RX ORDER — ALLOPURINOL 100 MG/1
TABLET ORAL
Qty: 30 TABLET | Refills: 11 | Status: SHIPPED | OUTPATIENT
Start: 2021-03-02 | End: 2022-03-07

## 2021-03-02 RX ORDER — HYDROCHLOROTHIAZIDE 25 MG/1
TABLET ORAL
Qty: 30 TABLET | Refills: 11 | Status: SHIPPED | OUTPATIENT
Start: 2021-03-02 | End: 2022-03-07

## 2021-03-11 ENCOUNTER — OFFICE VISIT (OUTPATIENT)
Dept: FAMILY MEDICINE CLINIC | Age: 46
End: 2021-03-11
Payer: COMMERCIAL

## 2021-03-11 VITALS
WEIGHT: 315 LBS | HEART RATE: 87 BPM | RESPIRATION RATE: 20 BRPM | DIASTOLIC BLOOD PRESSURE: 84 MMHG | TEMPERATURE: 97.3 F | BODY MASS INDEX: 54.87 KG/M2 | OXYGEN SATURATION: 96 % | SYSTOLIC BLOOD PRESSURE: 138 MMHG

## 2021-03-11 DIAGNOSIS — E88.81 DYSMETABOLIC SYNDROME: ICD-10-CM

## 2021-03-11 DIAGNOSIS — E78.00 PURE HYPERCHOLESTEROLEMIA: ICD-10-CM

## 2021-03-11 DIAGNOSIS — M1A.0790 IDIOPATHIC CHRONIC GOUT OF FOOT WITHOUT TOPHUS, UNSPECIFIED LATERALITY: ICD-10-CM

## 2021-03-11 DIAGNOSIS — G47.33 OBSTRUCTIVE SLEEP APNEA ON CPAP: ICD-10-CM

## 2021-03-11 DIAGNOSIS — E66.01 MORBID OBESITY WITH BMI OF 50.0-59.9, ADULT (HCC): ICD-10-CM

## 2021-03-11 DIAGNOSIS — I10 ESSENTIAL HYPERTENSION: Primary | ICD-10-CM

## 2021-03-11 DIAGNOSIS — N52.8 OTHER MALE ERECTILE DYSFUNCTION: ICD-10-CM

## 2021-03-11 DIAGNOSIS — Z99.89 OBSTRUCTIVE SLEEP APNEA ON CPAP: ICD-10-CM

## 2021-03-11 DIAGNOSIS — Z72.0 TOBACCO ABUSE: ICD-10-CM

## 2021-03-11 DIAGNOSIS — K21.9 GASTROESOPHAGEAL REFLUX DISEASE WITHOUT ESOPHAGITIS: ICD-10-CM

## 2021-03-11 PROCEDURE — 99396 PREV VISIT EST AGE 40-64: CPT | Performed by: FAMILY MEDICINE

## 2021-03-11 PROCEDURE — G8484 FLU IMMUNIZE NO ADMIN: HCPCS | Performed by: FAMILY MEDICINE

## 2021-03-11 RX ORDER — ATORVASTATIN CALCIUM 20 MG/1
TABLET, FILM COATED ORAL
Qty: 30 TABLET | Refills: 11 | Status: SHIPPED | OUTPATIENT
Start: 2021-03-11 | End: 2022-03-31

## 2021-03-11 RX ORDER — LOSARTAN POTASSIUM 50 MG/1
TABLET ORAL
Qty: 60 TABLET | Refills: 11 | Status: SHIPPED | OUTPATIENT
Start: 2021-03-11 | End: 2022-03-31 | Stop reason: SDUPTHER

## 2021-03-11 ASSESSMENT — PATIENT HEALTH QUESTIONNAIRE - PHQ9
1. LITTLE INTEREST OR PLEASURE IN DOING THINGS: 1
SUM OF ALL RESPONSES TO PHQ9 QUESTIONS 1 & 2: 1
SUM OF ALL RESPONSES TO PHQ QUESTIONS 1-9: 1
SUM OF ALL RESPONSES TO PHQ QUESTIONS 1-9: 1

## 2021-03-11 NOTE — PROGRESS NOTES
[facility]  Dept: [phone]  Dept Fax: [phone]  Loc: [phone]    Arianne Christianson is a 39 y.o. male who presents today for:  Chief Complaint   Patient presents with    6 Month Follow-Up           HPI:     HPI    Hyperlipidemia: Patient presents with hyperlipidemia. He was tested because hypertension. His last labs showed   Lab Results   Component Value Date    CHOL 123 12/30/2020    CHOL 106 05/28/2019    CHOL 112 04/28/2018     Lab Results   Component Value Date    TRIG 152 12/30/2020    TRIG 131 05/28/2019    TRIG 157 04/28/2018     Lab Results   Component Value Date    HDL 36 12/30/2020    HDL 38 05/28/2019    HDL 34 04/28/2018     Lab Results   Component Value Date    LDLCALC 57 12/30/2020    LDLCALC 42 05/28/2019    LDLCALC 47 04/28/2018     No results found for: LABVLDL, VLDL  No results found for: CHOLHDLRATIO  There is a family history of hyperlipidemia. There is a family history of early ischemia heart disease. Hypertension: Patient here for follow-up of elevated blood pressure. He is exercising and is not adherent to low salt diet. Blood pressure is well controlled at home. Cardiac symptoms none. Patient denies chest pain, dyspnea and palpitations. Cardiovascular risk factors: none. Use of agents associated with hypertension: none. History of target organ damage: none. GERD well controlled on the PPI. BHARATHI on CPAP. Gout controlled on allopurinol. Reports that he has been smoking Cigarettes again. He has a 20.00 pack-year smoking history. He has never used smokeless tobacco.    Calling for ED since 5/2020. He is having trouble with his marriage that they are gong to counseling for. He is able to obtain an erection but soft and does not maintain to ejaculation. Morning erections still happening. Something similar happened a few years ago.   Uses cialis prn which helps.      Reviewed chart forpast medical history , surgical history , allergies, social history , family history and medications. Health Maintenance   Topic Date Due    Hepatitis C screen  Never done    Flu vaccine (1) 03/11/2022 (Originally 9/1/2020)    Pneumococcal 0-64 years Vaccine (1 of 1 - PPSV23) 03/11/2022 (Originally 9/14/1981)    Lipid screen  12/30/2021    Potassium monitoring  12/30/2021    Creatinine monitoring  12/30/2021    DTaP/Tdap/Td vaccine (2 - Td) 07/31/2022    Diabetes screen  12/30/2023    Hepatitis A vaccine  Aged Out    Hepatitis B vaccine  Aged Out    Hib vaccine  Aged Out    Meningococcal (ACWY) vaccine  Aged Out    HIV screen  Discontinued       Subjective:      Constitutional:Negative for fever, chills, diaphoresis, activity change, appetite change and fatigue. HENT: Negative for hearing loss, ear pain, congestion, sore throat, rhinorrhea, postnasal drip and ear discharge. Eyes: Negative for photophobia and visual disturbance. Respiratory: Negative for cough, chest tightness, shortness of breath and wheezing. Cardiovascular: Negative for chest pain and leg swelling. Gastrointestinal: Negative for nausea, vomiting, abdominal pain, diarrhea and constipation. Genitourinary: Negative for dysuria, urgency and frequency. Neurological: Negative for weakness, light-headedness and headaches. Psychiatric/Behavioral: Negative for sleep disturbance.      :     Vitals:    03/11/21 1601   BP: 138/84   Site: Left Wrist   Position: Sitting   Cuff Size: Medium Adult   Pulse: 87   Resp: 20   Temp: 97.3 °F (36.3 °C)   TempSrc: Temporal   SpO2: 96%   Weight: (!) 439 lb (199.1 kg)     Wt Readings from Last 3 Encounters:   03/11/21 (!) 439 lb (199.1 kg)   09/10/20 (!) 425 lb (192.8 kg)   05/27/20 (!) 428 lb (194.1 kg)       Physical Exam  Constitutional: Vital signs are normal. He appears well-developed and well-nourished. He is active.    HENT:   Head: Normocephalic and atraumatic. Right Ear: Tympanic membrane, external ear and ear canal normal. No drainage or tenderness. Left Ear: Tympanic membrane, external ear and ear canal normal. No drainage or tenderness. Nose: Nose normal. No mucosal edema or rhinorrhea. Mouth/Throat: Uvula is midline, oropharynx is clear and moist and mucous membranes are normal. Mucous membranes are not pale. Normal dentition. No posterior oropharyngeal edema or posterior oropharyngeal erythema. Eyes: Lids are normal. Right eye exhibits no chemosis and no discharge. Left eye exhibits no chemosis and no drainage. Right conjunctiva has no hemorrhage. Left conjunctiva has no hemorrhage. Right eye exhibits normal extraocular motion. Left eye exhibits normal extraocular motion. Right pupil is round and reactive. Left pupil is round and reactive. Pupils are equal.   Cardiovascular: Normal rate, regular rhythm, S1 normal, S2 normal and normal heart sounds. Exam reveals no gallop. No murmur heard. Pulmonary/Chest: Effort normal and breath sounds normal. No respiratory distress. He has no wheezes. He has no rhonchi. He has no rales. Abdominal: Soft. Normal appearance and bowel sounds are normal. He exhibits no distension and no mass. There is no hepatosplenomegaly. No tenderness. He has no rigidity, no rebound and no guarding. No hernia. Musculoskeletal:        Right lower leg: He exhibits no edema. Left lower leg: He exhibits no edema. Neurological: He is alert. Oriented and pleasent        Assessment/Plan   Benitez Arriaga was seen today for 6 month follow-up.     Diagnoses and all orders for this visit:    Essential hypertension  -     losartan (COZAAR) 50 MG tablet; TAKE ONE TABLET BY MOUTH TWICE A DAY    Pure hypercholesterolemia  -     atorvastatin (LIPITOR) 20 MG tablet; 1 po  qd    Idiopathic chronic gout of foot without tophus, unspecified laterality    Gastroesophageal reflux disease without esophagitis    Other male erectile dysfunction    Obstructive sleep apnea on CPAP    Dysmetabolic syndrome    Morbid obesity with BMI of 50.0-59.9, adult (HCC)    Tobacco abuse    No change to medications   Continue healthy diet and exercise  Aspirin daily    [place] foods  Small meals  No late meals     Discussed use, benefit, and side effectsof prescribed medications. All patient questions answered. Pt voiced understanding. Reviewed health maintenance. Instructed to continue current medications, diet and exercise. Patient agreed with treatment plan. Followup as directed.      Electronically signed by Lucio Gilbert MD

## 2021-04-27 ENCOUNTER — OFFICE VISIT (OUTPATIENT)
Dept: PULMONOLOGY | Age: 46
End: 2021-04-27
Payer: COMMERCIAL

## 2021-04-27 VITALS
HEART RATE: 86 BPM | BODY MASS INDEX: 39.17 KG/M2 | WEIGHT: 315 LBS | DIASTOLIC BLOOD PRESSURE: 76 MMHG | OXYGEN SATURATION: 97 % | SYSTOLIC BLOOD PRESSURE: 124 MMHG | HEIGHT: 75 IN

## 2021-04-27 DIAGNOSIS — E66.01 MORBID OBESITY WITH BMI OF 50.0-59.9, ADULT (HCC): ICD-10-CM

## 2021-04-27 DIAGNOSIS — G47.33 OBSTRUCTIVE SLEEP APNEA: Primary | ICD-10-CM

## 2021-04-27 PROCEDURE — 99213 OFFICE O/P EST LOW 20 MIN: CPT | Performed by: PHYSICIAN ASSISTANT

## 2021-04-27 PROCEDURE — 4004F PT TOBACCO SCREEN RCVD TLK: CPT | Performed by: PHYSICIAN ASSISTANT

## 2021-04-27 PROCEDURE — G8427 DOCREV CUR MEDS BY ELIG CLIN: HCPCS | Performed by: PHYSICIAN ASSISTANT

## 2021-04-27 PROCEDURE — G8417 CALC BMI ABV UP PARAM F/U: HCPCS | Performed by: PHYSICIAN ASSISTANT

## 2021-04-27 ASSESSMENT — ENCOUNTER SYMPTOMS
COUGH: 0
CHEST TIGHTNESS: 0
STRIDOR: 0
EYES NEGATIVE: 1
ALLERGIC/IMMUNOLOGIC NEGATIVE: 1
NAUSEA: 0
SHORTNESS OF BREATH: 0
DIARRHEA: 0
BACK PAIN: 0
WHEEZING: 0

## 2021-04-27 NOTE — PROGRESS NOTES
Allergic/Immunologic: Negative. Neurological: Negative. Negative for dizziness and light-headedness. Psychiatric/Behavioral: Negative. All other systems reviewed and are negative. Physical Exam:    BMI:  Body mass index is 55 kg/m². Wt Readings from Last 3 Encounters:   04/27/21 (!) 440 lb (199.6 kg)   03/11/21 (!) 439 lb (199.1 kg)   09/10/20 (!) 425 lb (192.8 kg)     Weight gained 15 lbs over 7 months  Vitals: /76 (Site: Right Lower Arm, Position: Sitting, Cuff Size: Large Adult)   Pulse 86   Ht 6' 3\" (1.905 m)   Wt (!) 440 lb (199.6 kg)   SpO2 97% Comment: on room air at rest  BMI 55.00 kg/m²       Physical Exam  Constitutional:       Appearance: He is well-developed. HENT:      Head: Normocephalic and atraumatic. Right Ear: External ear normal.      Left Ear: External ear normal.   Eyes:      Conjunctiva/sclera: Conjunctivae normal.      Pupils: Pupils are equal, round, and reactive to light. Neck:      Musculoskeletal: Normal range of motion and neck supple. Cardiovascular:      Rate and Rhythm: Normal rate and regular rhythm. Heart sounds: Normal heart sounds. Pulmonary:      Effort: Pulmonary effort is normal.      Breath sounds: Normal breath sounds. Musculoskeletal: Normal range of motion. Skin:     General: Skin is warm and dry. Neurological:      Mental Status: He is alert and oriented to person, place, and time. Psychiatric:         Behavior: Behavior normal.         Thought Content: Thought content normal.         Judgment: Judgment normal.           ASSESSMENT/DIAGNOSIS     Diagnosis Orders   1. Obstructive sleep apnea     2. Morbid obesity with BMI of 50.0-59.9, adult [facility])              Plan   Do you need any equipment today? Yes update supplies  - Download reviewed and discussed with patient  - He  was advised to continue current positive airway pressure therapy with above described pressure.    - He  advised to keep good compliance with current recommended pressure to get optimal results and clinical improvement  - Recommend 7-9 hours of sleep with PAP  - He was advised to call DME company regarding supplies if needed.   -He call my office for earlier appointment if needed for worsening of sleep symptoms.   - He was instructed on weight loss  - Terri Russell was educated about my impression and plan. Patient verbalizesunderstanding.   We will see Valerie Ventura back in: 1 years with download    Information added by my medical assistant/LPN was reviewed today         Adilson Craven PA-C, MPAS  4/27/2021

## 2021-04-27 NOTE — PATIENT INSTRUCTIONS
Patient Education        Learning About Benefits From Quitting Smoking  How does quitting smoking make you healthier? If you're thinking about quitting smoking, you may have a few reasons to be smoke-free. Your health may be one of them. · When you quit smoking, you lower your risks for cancer, lung disease, heart attack, stroke, blood vessel disease, and blindness from macular degeneration. · When you're smoke-free, you get sick less often, and you heal faster. You are less likely to get colds, flu, bronchitis, and pneumonia. · As a nonsmoker, you may find that your mood is better and you are less stressed. When and how will you feel healthier? Quitting has real health benefits that start from day 1 of being smoke-free. And the longer you stay smoke-free, the healthier you get and the better you feel. The first hours  · After just 20 minutes, your blood pressure and heart rate go down. That means there's less stress on your heart and blood vessels. · Within 12 hours, the level of carbon monoxide in your blood drops back to normal. That makes room for more oxygen. With more oxygen in your body, you may notice that you have more energy than when you smoked. After 2 weeks  · Your lungs start to work better. · Your risk of heart attack starts to drop. After 1 month  · When your lungs are clear, you cough less and breathe deeper, so it's easier to be active. · Your sense of taste and smell return. That means you can enjoy food more than you have since you started smoking. Over the years  · Over the years, your risks of heart disease, heart attack, and stroke are lower. · After 10 years, your risk of dying from lung cancer is cut by about half. And your risk for many other types of cancer is lower too. How would quitting help others in your life? When you quit smoking, you improve the health of everyone who now breathes in your smoke. · Their heart, lung, and cancer risks drop, much like yours.   · They are sick less. For babies and small children, living smoke-free means they're less likely to have ear infections, pneumonia, and bronchitis. · If you're a woman who is or will be pregnant someday, quitting smoking means a healthier . · Children who are close to you are less likely to become adult smokers. Where can you learn more? Go to https://[employer]marylin.[email or web address]. org and sign in to your [employer] account. Enter [phone] in the [employer] box to learn more about \"Learning About Benefits From Quitting Smoking. \"     If you do not have an account, please click on the \"Sign Up Now\" link. Current as of: 2020               Content Version: 12.8  © 7528-0481 Healthwise, Incorporated. Care instructions adapted under license by [facility]. If you have questions about a medical condition or this instruction, always ask your healthcare professional. [place] [address] any warranty or liability for your use of this information.

## 2021-06-02 ENCOUNTER — OFFICE VISIT (OUTPATIENT)
Dept: CARDIOLOGY CLINIC | Age: 46
End: 2021-06-02
Payer: COMMERCIAL

## 2021-06-02 VITALS
HEART RATE: 84 BPM | WEIGHT: 315 LBS | DIASTOLIC BLOOD PRESSURE: 80 MMHG | BODY MASS INDEX: 39.17 KG/M2 | SYSTOLIC BLOOD PRESSURE: 138 MMHG | HEIGHT: 75 IN

## 2021-06-02 DIAGNOSIS — R00.2 HEART PALPITATIONS: Primary | ICD-10-CM

## 2021-06-02 DIAGNOSIS — I10 ESSENTIAL HYPERTENSION: ICD-10-CM

## 2021-06-02 PROCEDURE — G8427 DOCREV CUR MEDS BY ELIG CLIN: HCPCS | Performed by: INTERNAL MEDICINE

## 2021-06-02 PROCEDURE — 4004F PT TOBACCO SCREEN RCVD TLK: CPT | Performed by: INTERNAL MEDICINE

## 2021-06-02 PROCEDURE — 93000 ELECTROCARDIOGRAM COMPLETE: CPT | Performed by: INTERNAL MEDICINE

## 2021-06-02 PROCEDURE — G8417 CALC BMI ABV UP PARAM F/U: HCPCS | Performed by: INTERNAL MEDICINE

## 2021-06-02 PROCEDURE — 99213 OFFICE O/P EST LOW 20 MIN: CPT | Performed by: INTERNAL MEDICINE

## 2021-06-02 NOTE — PROGRESS NOTES
[facility]  [address]  Dept: [phone]  Dept Fax: [phone]: [phone]    Visit Date: 6/2/2021    Mr. Irena Shaw is a 39 y.o. male  who presented for:  Chief Complaint   Patient presents with    Check-Up    Hypertension       HPI:   40 yo M c hx HTN, HLD, Smokes (15cigs per day), BHARATHI on CPAP is here for a follow up. Patient has previously seen Dr. Nessa Esteban in the past.   Denies any chest pain,no radiating, no aggravating or alleviating factors, not associated sob, palpitations, lightheadedness, dizziness, orthopnea, PND or pedal edema.          Current Outpatient Medications:     Ascorbic Acid (MAURILIO-C PO), Take by mouth daily, Disp: , Rfl:     losartan (COZAAR) 50 MG tablet, TAKE ONE TABLET BY MOUTH TWICE A DAY, Disp: 60 tablet, Rfl: 11    atorvastatin (LIPITOR) 20 MG tablet, 1 po  qd, Disp: 30 tablet, Rfl: 11    hydroCHLOROthiazide (HYDRODIURIL) 25 MG tablet, TAKE ONE TABLET BY MOUTH EVERY DAY, Disp: 30 tablet, Rfl: 11    allopurinol (ZYLOPRIM) 100 MG tablet, TAKE ONE TABLET BY MOUTH EVERY DAY, Disp: 30 tablet, Rfl: 11    ibuprofen (ADVIL;MOTRIN) 600 MG tablet, Take 1 tablet by mouth every 6 hours as needed for Pain, Disp: 30 tablet, Rfl: 0    Esomeprazole Magnesium (NEXIUM PO), Take by mouth daily, Disp: , Rfl:     ZINC PO, Take by mouth, Disp: , Rfl:     Past Medical History  Racheal Scott  has a past medical history of Benign intracranial hypertension, Dysmetabolic syndrome, Family history of premature ventricular contractions, GERD (gastroesophageal reflux disease), Gout, Hemorrhoids, History of morbid obesity, Hyperlipemia, BHARATHI on CPAP, Pseudotumor cerebri, Second opinion for hypertension, and Tobacco abuse. Social History  Racheal Scott  reports that he has been smoking cigarettes. He started smoking about 27 years ago. He has a 25.00 pack-year smoking history.  He has never used smokeless tobacco. He reports current alcohol use of about 12.0 standard drinks of alcohol per week. He reports that he does not use drugs. Family History  Patrina Najjar family history includes Cancer in his maternal grandmother, mother, and paternal aunt; Diabetes in his father; Heart Disease in his father; Pacemaker in his father. Past Surgical History   Past Surgical History:   Procedure Laterality Date    CARDIOVASCULAR STRESS TEST  5 29 2007    Preserved LV function and estimated EF of 65%.  FINGER SURGERY      HAND TENDON SURGERY      TRANSTHORACIC ECHOCARDIOGRAM  1 11 2011    EF 55-65% with macrocytic LVH, stage I dysfunction, left atrium mildly dilated probably secondary to diastolic dysfunction. Demonstrated right ventricle markedly dilated. Subjective:     REVIEW OF SYSTEMS  Constitutional: denies sweats, chills and fever  HENT: denies  congestion, sinus pressure, sneezing and sore throat. Eyes: denies  pain, discharge, redness and itching. Respiratory: denies apnea, cough  Gastrointestinal: denies blood in stool, constipation, diarrhea   Endocrine: denies cold intolerance, heat intolerance, polydipsia. Genitourinary: denies dysuria, enuresis, flank pain and hematuria. Musculoskeletal: denies arthralgias, joint swelling and neck pain. Neurological: denies numbness and headaches. Psychiatric/Behavioral: denies agitation, confusion, decreased concentration and dysphoric mood    All others reviewed and are negative. Objective:     /80   Pulse 84   Ht 6' 3\" (1.905 m)   Wt (!) 435 lb (197.3 kg)   BMI 54.37 kg/m²     Wt Readings from Last 3 Encounters:   06/02/21 (!) 435 lb (197.3 kg)   04/27/21 (!) 440 lb (199.6 kg)   03/11/21 (!) 439 lb (199.1 kg)     BP Readings from Last 3 Encounters:   06/02/21 138/80   04/27/21 124/76   03/11/21 138/84       PHYSICAL EXAM  Constitutional: Oriented to person, place, and time. Appears well-developed and well-nourished. HENT:   Head: Normocephalic and atraumatic.    Eyes: EOM are normal. Pupils are equal, round, and reactive to light. Neck: Normal range of motion. Neck supple. No JVD present. Cardiovascular: Normal rate , normal heart sounds and intact distal pulses. Pulmonary/Chest: Effort normal and breath sounds normal. No respiratory distress. No wheezes. No rales. Abdominal: Soft. Bowel sounds are normal. No distension. There is no tenderness. Musculoskeletal: Normal range of motion. No edema. Neurological: Alert and oriented to person, place, and time. No cranial nerve deficit. Coordination normal.   Skin: Skin is warm and dry. Psychiatric: Normal mood and affect.        No results found for: CKTOTAL, CKMB, CKMBINDEX    Lab Results   Component Value Date    WBC 5.8 12/30/2020    RBC 4.54 12/30/2020    HGB 14.4 12/30/2020    HCT 44.3 12/30/2020    MCV 97.6 12/30/2020    MCH 31.7 12/30/2020    MCHC 32.5 12/30/2020    RDW 12.8 04/28/2018     12/30/2020    MPV 10.5 12/30/2020       Lab Results   Component Value Date     12/30/2020    K 4.2 12/30/2020     12/30/2020    CO2 27 12/30/2020    BUN 15 12/30/2020    LABALBU 4.3 12/30/2020    CREATININE 0.8 12/30/2020    CALCIUM 9.4 12/30/2020    LABGLOM >90 12/30/2020    GLUCOSE 108 12/30/2020       Lab Results   Component Value Date    ALKPHOS 66 12/30/2020    ALT 26 12/30/2020    AST 20 12/30/2020    PROT 7.1 12/30/2020    BILITOT 0.5 12/30/2020    BILIDIR <0.2 07/22/2015    LABALBU 4.3 12/30/2020       No results found for: MG    No results found for: INR, PROTIME      Lab Results   Component Value Date    LABA1C 4.9 12/30/2020       Lab Results   Component Value Date    TRIG 152 12/30/2020    HDL 36 12/30/2020    [address] 57 12/30/2020       Lab Results   Component Value Date    TSH 1.860 12/30/2020         Testing Reviewed:      I haveindividually reviewed the below cardiac tests    EKG:    ECHO:   Results for orders placed during the hospital encounter of 03/10/17   ECHO Complete 2D W Doppler W Color trileaflet with good leaflet separation. Tricuspid Valve   Tricuspid valve is structurally normal.   Tricuspid valve was not well visualized. trivial tricuspid regurgitation. Pulmonic Valve   Pulmonic valve is structurally normal.      Left Atrium   top Normal size left atrium. Left Ventricle   Systolic function was normal.   Ejection fraction is visually estimated at 55%. The left ventricle was not well visualized. Doppler parameters were consistent with abnormal left ventricular   relaxation (grade 1 diastolic dysfunction). Right Atrium   The right atrium is of normal size. Right Ventricle   Normal right ventricular size and function. Pericardial Effusion   No evidence of any pericardial effusion.      M-Mode/2D Measurements & Calculations      LV Diastolic    LV Systolic Dimension: 3.7   AV Cusp Separation: 2.5 cmLA   Dimension: 5.1  cm                           Dimension: 4 cmAO Root   cm              LV Volume Diastolic: 850 ml  Dimension: 3.8 cm   LV FS:27.5 %    LV Volume Systolic: 44.3 ml   LV PW           LV EDV/LV EDV Index: 482   Diastolic: 1 cm MD/01 V^4NU ESV/LV ESV   Septum          Index: 58.1 ml/20 m^2        RV Diastolic Dimension: 2.8   Diastolic: 0.9  EF Calculated: 53.2 %        cm   cm                                                LA/Aorta: 1.05     Doppler Measurements & Calculations      MV Peak E-Wave: 104 cm/s   AV Peak Velocity: 147 LVOT Peak Velocity: 129   MV Peak A-Wave: 72.1 cm/s  cm/s                  cm/s   MV E/A Ratio: 1.44         AV Peak Gradient:     LVOT Peak Gradient: 7   MV Peak Gradient: 4.33     8.64 mmHg             mmHg   mmHg                                                    TV Peak E-Wave: 68.1 cm/s   MV Deceleration Time: 232                        TV Peak A-Wave: 45.9 cm/s   msec                                                    TV Peak Gradient: 1.86                                                    mmHg   MV E' Septal Velocity: TR Velocity:198 cm/s   8.68 cm/s                  AV DVI (Vmax):0.88    TR Gradient:15.68 mmHg   MV A' Septal Velocity:                           PV Peak Velocity: 66.1   9.07 cm/s                                        cm/s   MV E' Lateral Velocity:                          PV Peak Gradient: 1.75   10.6 cm/s                                        mmHg   MV A' Lateral Velocity:   10.6 cm/s   E/E' septal: 11.98   E/E' lateral: 9.81   MR Velocity: 330 cm/s     [id number]       STRESS:    CATH:    Assessment/Plan       Diagnosis Orders   1. Heart palpitations  EKG 12 lead   2. Essential hypertension  EKG 12 lead       BHARATHI on CPAP  Smoker  HTN     Reviewed EKG from today, no significant changes  No symptoms at presents  Advised to loose weight  BP controlled  Continue Losartan, HCTZ, lipitor  The patient is asked to make an attempt to improve diet and exercise patterns to aid in medical management of this problem. Smoking: discussed with the patient the importance of smoke cessation especially with the risk of CAD. Advised patient to call office or seek immediate medical attention if there is any new onset of  any chest pain, sob, palpitations, lightheadedness, dizziness, orthopnea, PND or pedal edema. Thank youfor allowing me to participate in the care of this patient. Please do not hesitate to contact me for any further questions. Return if symptoms worsen or fail to improve, for Regular follow up, Review testing.        Electronically signed by Enzo Gage MD [facility]  6/2/2021 at 3:11 PM

## 2021-09-15 ENCOUNTER — OFFICE VISIT (OUTPATIENT)
Dept: FAMILY MEDICINE CLINIC | Age: 46
End: 2021-09-15
Payer: COMMERCIAL

## 2021-09-15 VITALS
TEMPERATURE: 97.6 F | HEIGHT: 75 IN | HEART RATE: 73 BPM | SYSTOLIC BLOOD PRESSURE: 134 MMHG | WEIGHT: 315 LBS | DIASTOLIC BLOOD PRESSURE: 86 MMHG | OXYGEN SATURATION: 96 % | RESPIRATION RATE: 16 BRPM | BODY MASS INDEX: 39.17 KG/M2

## 2021-09-15 DIAGNOSIS — Z00.00 ROUTINE GENERAL MEDICAL EXAMINATION AT HEALTH CARE FACILITY: ICD-10-CM

## 2021-09-15 DIAGNOSIS — G93.2 PSEUDOTUMOR CEREBRI: ICD-10-CM

## 2021-09-15 DIAGNOSIS — K21.9 GASTROESOPHAGEAL REFLUX DISEASE WITHOUT ESOPHAGITIS: ICD-10-CM

## 2021-09-15 DIAGNOSIS — E88.81 DYSMETABOLIC SYNDROME: ICD-10-CM

## 2021-09-15 DIAGNOSIS — Z72.0 TOBACCO ABUSE: ICD-10-CM

## 2021-09-15 DIAGNOSIS — E78.00 PURE HYPERCHOLESTEROLEMIA: ICD-10-CM

## 2021-09-15 DIAGNOSIS — Z99.89 OBSTRUCTIVE SLEEP APNEA ON CPAP: ICD-10-CM

## 2021-09-15 DIAGNOSIS — I10 ESSENTIAL HYPERTENSION: Primary | ICD-10-CM

## 2021-09-15 DIAGNOSIS — E66.01 MORBID OBESITY WITH BMI OF 50.0-59.9, ADULT (HCC): ICD-10-CM

## 2021-09-15 DIAGNOSIS — G93.2 BENIGN INTRACRANIAL HYPERTENSION: ICD-10-CM

## 2021-09-15 DIAGNOSIS — R06.02 SOB (SHORTNESS OF BREATH): ICD-10-CM

## 2021-09-15 DIAGNOSIS — N52.8 OTHER MALE ERECTILE DYSFUNCTION: ICD-10-CM

## 2021-09-15 DIAGNOSIS — G47.33 OBSTRUCTIVE SLEEP APNEA ON CPAP: ICD-10-CM

## 2021-09-15 DIAGNOSIS — M1A.0790 IDIOPATHIC CHRONIC GOUT OF FOOT WITHOUT TOPHUS, UNSPECIFIED LATERALITY: ICD-10-CM

## 2021-09-15 PROCEDURE — G8417 CALC BMI ABV UP PARAM F/U: HCPCS | Performed by: FAMILY MEDICINE

## 2021-09-15 PROCEDURE — G8427 DOCREV CUR MEDS BY ELIG CLIN: HCPCS | Performed by: FAMILY MEDICINE

## 2021-09-15 PROCEDURE — 4004F PT TOBACCO SCREEN RCVD TLK: CPT | Performed by: FAMILY MEDICINE

## 2021-09-15 PROCEDURE — 99214 OFFICE O/P EST MOD 30 MIN: CPT | Performed by: FAMILY MEDICINE

## 2021-09-15 RX ORDER — FUROSEMIDE 20 MG/1
20 TABLET ORAL DAILY
Qty: 5 TABLET | Refills: 0 | Status: SHIPPED | OUTPATIENT
Start: 2021-09-15 | End: 2022-02-10

## 2021-09-15 SDOH — ECONOMIC STABILITY: FOOD INSECURITY: WITHIN THE PAST 12 MONTHS, YOU WORRIED THAT YOUR FOOD WOULD RUN OUT BEFORE YOU GOT MONEY TO BUY MORE.: NEVER TRUE

## 2021-09-15 SDOH — ECONOMIC STABILITY: FOOD INSECURITY: WITHIN THE PAST 12 MONTHS, THE FOOD YOU BOUGHT JUST DIDN'T LAST AND YOU DIDN'T HAVE MONEY TO GET MORE.: NEVER TRUE

## 2021-09-15 ASSESSMENT — SOCIAL DETERMINANTS OF HEALTH (SDOH): HOW HARD IS IT FOR YOU TO PAY FOR THE VERY BASICS LIKE FOOD, HOUSING, MEDICAL CARE, AND HEATING?: NOT HARD AT ALL

## 2021-09-15 NOTE — PROGRESS NOTES
[address] [facility]  Dept: [phone]  Dept Fax: [phone]  Loc: [phone]    Wayne Carrera is a 55 y.o. male who presents today for:  Chief Complaint   Patient presents with    6 Month Follow-Up           HPI:     HPI    Hyperlipidemia: Patient presents with hyperlipidemia. He was tested because hypertension. His last labs showed   Lab Results   Component Value Date    CHOL 123 12/30/2020    CHOL 106 05/28/2019    CHOL 112 04/28/2018     Lab Results   Component Value Date    TRIG 152 12/30/2020    TRIG 131 05/28/2019    TRIG 157 04/28/2018     Lab Results   Component Value Date    HDL 36 12/30/2020    HDL 38 05/28/2019    HDL 34 04/28/2018     Lab Results   Component Value Date    LDLCALC 57 12/30/2020    LDLCALC 42 05/28/2019    LDLCALC 47 04/28/2018     No results found for: LABVLDL, VLDL  No results found for: CHOLHDLRATIO  There is a family history of hyperlipidemia. There is a family history of early ischemia heart disease. Hypertension: Patient here for follow-up of elevated blood pressure. He is exercising and is not adherent to low salt diet. Blood pressure is well controlled at home. Cardiac symptoms none. Patient denies chest pain, dyspnea and palpitations. Cardiovascular risk factors: none. Use of agents associated with hypertension: none. History of target organ damage: none. GERD well controlled on the PPI. BHARATHI on CPAP. Gout controlled on allopurinol. Reports that he has been smoking Cigarettes again. He has a 20.00 pack-year smoking history. He has never used smokeless tobacco.    Calling for ED since 5/2020. He is having trouble with his marriage that they are gong to counseling for. He is able to obtain an erection but soft and does not maintain to ejaculation. Morning erections still happening. Something similar happened a few years ago.   Uses cialis prn which helps.        Reviewed chart forpast medical history , surgical history , allergies, social history , family history and medications. Health Maintenance   Topic Date Due    Hepatitis C screen  Never done    Colon cancer screen colonoscopy  Never done    Pneumococcal 0-64 years Vaccine (1 of 2 - PPSV23) 03/11/2022 (Originally 9/14/1981)    COVID-19 Vaccine (1) 09/10/2022 (Originally 9/14/1987)    Flu vaccine (1) 09/15/2022 (Originally 9/1/2021)    Lipid screen  12/30/2021    Potassium monitoring  12/30/2021    Creatinine monitoring  12/30/2021    DTaP/Tdap/Td vaccine (2 - Td or Tdap) 07/31/2022    Diabetes screen  12/30/2023    Hepatitis A vaccine  Aged Out    Hepatitis B vaccine  Aged Out    Hib vaccine  Aged Out    Meningococcal (ACWY) vaccine  Aged Out    HIV screen  Discontinued       Subjective:      Constitutional:Negative for fever, chills, diaphoresis, activity change, appetite change and fatigue. HENT: Negative for hearing loss, ear pain, congestion, sore throat, rhinorrhea, postnasal drip and ear discharge. Eyes: Negative for photophobia and visual disturbance. Respiratory: Negative for cough, chest tightness, shortness of breath and wheezing. Cardiovascular: Negative for chest pain and leg swelling. Gastrointestinal: Negative for nausea, vomiting, abdominal pain, diarrhea and constipation. Genitourinary: Negative for dysuria, urgency and frequency. Neurological: Negative for weakness, light-headedness and headaches.    Psychiatric/Behavioral: Negative for sleep disturbance.      :     Vitals:    09/15/21 1543   BP: 134/86   Site: Left Lower Arm   Position: Sitting   Cuff Size: Large Adult   Pulse: 73   Resp: 16   Temp: 97.6 °F (36.4 °C)   TempSrc: Temporal   SpO2: 96%   Weight: (!) 446 lb (202.3 kg)   Height: 6' 3\" (1.905 m)     Wt Readings from Last 3 Encounters:   09/15/21 (!) 446 lb (202.3 kg)   06/02/21 (!) 435 lb (197.3 kg)   04/27/21 (!) 440 lb (199.6 kg) Physical Exam  Constitutional: Vital signs are normal. He appears well-developed and well-nourished. He is active. HENT:   Head: Normocephalic and atraumatic. Right Ear: Tympanic membrane, external ear and ear canal normal. No drainage or tenderness. Left Ear: Tympanic membrane, external ear and ear canal normal. No drainage or tenderness. Nose: Nose normal. No mucosal edema or rhinorrhea. Mouth/Throat: Uvula is midline, oropharynx is clear and moist and mucous membranes are normal. Mucous membranes are not pale. Normal dentition. No posterior oropharyngeal edema or posterior oropharyngeal erythema. Eyes: Lids are normal. Right eye exhibits no chemosis and no discharge. Left eye exhibits no chemosis and no drainage. Right conjunctiva has no hemorrhage. Left conjunctiva has no hemorrhage. Right eye exhibits normal extraocular motion. Left eye exhibits normal extraocular motion. Right pupil is round and reactive. Left pupil is round and reactive. Pupils are equal.   Cardiovascular: Normal rate, regular rhythm, S1 normal, S2 normal and normal heart sounds. Exam reveals no gallop. No murmur heard. Pulmonary/Chest: Effort normal and breath sounds normal. No respiratory distress. He has no wheezes. He has no rhonchi. He has no rales. Abdominal: Soft. Normal appearance and bowel sounds are normal. He exhibits no distension and no mass. There is no hepatosplenomegaly. No tenderness. He has no rigidity, no rebound and no guarding. No hernia. Musculoskeletal:        Right lower leg: He exhibits no edema. Left lower leg: He exhibits no edema. Neurological: He is alert. Oriented and pleasent        Assessment/Plan   Aiden Solomon was seen today for 6 month follow-up. Diagnoses and all orders for this visit:    Essential hypertension  -     furosemide (LASIX) 20 MG tablet; Take 1 tablet by mouth daily for 5 days    Pure hypercholesterolemia  -     Comprehensive Metabolic Panel, Fasting;  Future  - Lipid Panel; Future    Idiopathic chronic gout of foot without tophus, unspecified laterality  -     Uric Acid; Future    Gastroesophageal reflux disease without esophagitis  -     CBC; Future    Other male erectile dysfunction    Obstructive sleep apnea on CPAP    Dysmetabolic syndrome    Tobacco abuse    Benign intracranial hypertension    Morbid obesity with BMI of 50.0-59.9, adult (HCC)  -     TSH With Reflex Ft4; Future  -     Hemoglobin A1C; Future    Pseudotumor cerebri    SOB (shortness of breath)  -     XR CHEST STANDARD (2 VW); Future  -     furosemide (LASIX) 20 MG tablet; Take 1 tablet by mouth daily for 5 days    Routine general medical examination at health care facility  -     CBC; Future  -     Comprehensive Metabolic Panel, Fasting; Future  -     Lipid Panel; Future  -     TSH With Reflex Ft4; Future  -     Hemoglobin A1C; Future    No change to medications   Continue healthy diet and exercise  Aspirin daily    Discussed use, benefit, and side effectsof prescribed medications. All patient questions answered. Pt voiced understanding. Reviewed health maintenance. Instructed to continue current medications, diet and exercise. Patient agreed with treatment plan. Followup as directed.      Electronically signed by Imer Alvarado MD

## 2021-11-08 ENCOUNTER — TELEPHONE (OUTPATIENT)
Dept: FAMILY MEDICINE CLINIC | Age: 46
End: 2021-11-08

## 2021-11-08 ENCOUNTER — HOSPITAL ENCOUNTER (OUTPATIENT)
Age: 46
Setting detail: SPECIMEN
Discharge: HOME | End: 2021-11-08
Payer: COMMERCIAL

## 2021-11-08 DIAGNOSIS — U07.1 COVID-19: ICD-10-CM

## 2021-11-08 DIAGNOSIS — R05.9 COUGH: Primary | ICD-10-CM

## 2021-11-08 DIAGNOSIS — R05.9 COUGH: ICD-10-CM

## 2021-11-08 DIAGNOSIS — R43.0 ANOSMIA: ICD-10-CM

## 2021-11-08 LAB
GROUP A STREP CULTURE, REFLEX: NEGATIVE
REFLEX THROAT C + S: NORMAL
SARS-COV-2, NAAT: DETECTED

## 2021-11-08 PROCEDURE — 87880 STREP A ASSAY W/OPTIC: CPT

## 2021-11-08 PROCEDURE — 87635 SARS-COV-2 COVID-19 AMP PRB: CPT

## 2021-11-08 PROCEDURE — 87070 CULTURE OTHR SPECIMN AEROBIC: CPT

## 2021-11-08 RX ORDER — DIPHENHYDRAMINE HYDROCHLORIDE 50 MG/ML
50 INJECTION INTRAMUSCULAR; INTRAVENOUS ONCE
Status: CANCELLED | OUTPATIENT
Start: 2021-11-08 | End: 2021-11-08

## 2021-11-08 RX ORDER — SODIUM CHLORIDE 0.9 % (FLUSH) 0.9 %
5-40 SYRINGE (ML) INJECTION PRN
Status: CANCELLED | OUTPATIENT
Start: 2021-11-08

## 2021-11-08 RX ORDER — SODIUM CHLORIDE 9 MG/ML
INJECTION, SOLUTION INTRAVENOUS CONTINUOUS
Status: CANCELLED | OUTPATIENT
Start: 2021-11-08

## 2021-11-08 RX ORDER — EPINEPHRINE 1 MG/ML
0.3 INJECTION, SOLUTION, CONCENTRATE INTRAVENOUS PRN
Status: CANCELLED | OUTPATIENT
Start: 2021-11-08

## 2021-11-08 RX ORDER — HEPARIN SODIUM (PORCINE) LOCK FLUSH IV SOLN 100 UNIT/ML 100 UNIT/ML
500 SOLUTION INTRAVENOUS PRN
Status: CANCELLED | OUTPATIENT
Start: 2021-11-08

## 2021-11-08 RX ORDER — SODIUM CHLORIDE 9 MG/ML
25 INJECTION, SOLUTION INTRAVENOUS PRN
Status: CANCELLED | OUTPATIENT
Start: 2021-11-08

## 2021-11-08 RX ORDER — METHYLPREDNISOLONE SODIUM SUCCINATE 125 MG/2ML
125 INJECTION, POWDER, LYOPHILIZED, FOR SOLUTION INTRAMUSCULAR; INTRAVENOUS ONCE
Status: CANCELLED | OUTPATIENT
Start: 2021-11-08 | End: 2021-11-08

## 2021-11-08 NOTE — TELEPHONE ENCOUNTER
Spoke with pt who stated he is experiencing body aches, headache, loss of smell, a mild cough and temp this morning of 99.6. Pt denies SOB    Pt has not taking anything otc but has been taking vitamin d, c and zinc.     Pt stated symptoms started 1.5 wk ago. Pt stated he wasn't \"sick\" but just started feeling weird then. Pt developed most other symptoms then Friday afternoon. Pt was requesting to be seen today for an appointment. Possible exposure to covid but unsure. Pt said a co worker had covid but they were not overly close to each other.

## 2021-11-08 NOTE — TELEPHONE ENCOUNTER
Patient called due to seeing his COVID results on mychart and needing info on what to do. Quarantine instructions given to patient. Needing a letter written to his work stating he is positive and needs to quarantine for 10 days. Letter written and faxed to [phone].

## 2021-11-08 NOTE — LETTER
NOTIFICATION RETURN TO WORK     11/8/2021    Mr. Nisha Pearson  [address] [id number]      To Whom It May Concern:    Nisha Pearson was tested for COVID-19 on 11/8, and the result was positive. He may return to work on 11/16. I recommend:return without restrictions    If there are questions or concerns, please have the patient contact our office.         Sincerely,      [place] Dill MD

## 2021-11-08 NOTE — TELEPHONE ENCOUNTER
----- Message from [place] sent at 11/8/2021  8:57 AM EST -----  Subject: Appointment Request    Reason for Call: Semi-Routine Cough, Cold Symptoms    QUESTIONS  Type of Appointment? Established Patient  Reason for appointment request? Available appointments did not meet   patient need  Additional Information for Provider? Pt is having sinus problems since   Friday afternoon. Pt did not want to do a VV today at 4pm with his   provider. Pt is asking to come in. Pt took off work today to come in   office. Pt is asking if he needs to get tested for COVID or not.   ---------------------------------------------------------------------------  --------------  CALL BACK INFO  What is the best way for the office to contact you? OK to leave message on   voicemail  Preferred Call Back Phone Number? [phone]  ---------------------------------------------------------------------------  --------------  SCRIPT ANSWERS  Relationship to Patient? Self  Are you currently unable to finish sentences due to any difficulty   breathing? No  Are you unable to swallow liquids? No  Are you having fevers (100.4 or greater), chills, or sweats? No  Do you have COPD, asthma or a chronic lung condition? No  Have your symptoms been present for more than 5 days? No  Have you recently (14 days) been seen by a provider for this issue? No  Have you been diagnosed with, awaiting test results for, or told that you   are suspected of having COVID-19 (Coronavirus)? (If patient has tested   negative or was tested as a requirement for work, school, or travel and   not based on symptoms, answer no)? No  Within the past two weeks have you developed any of the following symptoms   (answer no if symptoms have been present longer than 2 weeks or began   more than 2 weeks ago)?  Fever or Chills, Cough, Shortness of breath or   difficulty breathing, Loss of taste or smell, Sore throat, Nasal   congestion, Sneezing or runny nose, Fatigue or generalized body aches   (answer no if pain is specific to a body part e.g. back pain), Diarrhea,   Headache?  Yes

## 2021-11-09 ENCOUNTER — HOSPITAL ENCOUNTER (OUTPATIENT)
Dept: GENERAL RADIOLOGY | Age: 46
Discharge: HOME | End: 2021-11-09
Payer: COMMERCIAL

## 2021-11-09 DIAGNOSIS — U07.1 COVID-19: Primary | ICD-10-CM

## 2021-11-09 RX ORDER — METHYLPREDNISOLONE SODIUM SUCCINATE 125 MG/2ML
125 INJECTION, POWDER, LYOPHILIZED, FOR SOLUTION INTRAMUSCULAR; INTRAVENOUS ONCE
Status: CANCELLED | OUTPATIENT
Start: 2021-11-09 | End: 2021-11-09

## 2021-11-09 RX ORDER — SODIUM CHLORIDE 9 MG/ML
25 INJECTION, SOLUTION INTRAVENOUS PRN
Status: CANCELLED | OUTPATIENT
Start: 2021-11-09

## 2021-11-09 RX ORDER — SODIUM CHLORIDE 9 MG/ML
INJECTION, SOLUTION INTRAVENOUS CONTINUOUS
Status: CANCELLED | OUTPATIENT
Start: 2021-11-09

## 2021-11-09 RX ORDER — DIPHENHYDRAMINE HYDROCHLORIDE 50 MG/ML
50 INJECTION INTRAMUSCULAR; INTRAVENOUS ONCE
Status: CANCELLED | OUTPATIENT
Start: 2021-11-09 | End: 2021-11-09

## 2021-11-09 RX ORDER — HEPARIN SODIUM (PORCINE) LOCK FLUSH IV SOLN 100 UNIT/ML 100 UNIT/ML
500 SOLUTION INTRAVENOUS PRN
Status: CANCELLED | OUTPATIENT
Start: 2021-11-09

## 2021-11-09 RX ORDER — SODIUM CHLORIDE 0.9 % (FLUSH) 0.9 %
5-40 SYRINGE (ML) INJECTION PRN
Status: CANCELLED | OUTPATIENT
Start: 2021-11-09

## 2021-11-09 NOTE — ED NOTES
Pt. ambultory to exam 2 and given EVU regerneron fact sheet to read.       René Mallory RN  11/09/21 2824

## 2021-11-10 ENCOUNTER — HOSPITAL ENCOUNTER (OUTPATIENT)
Dept: GENERAL RADIOLOGY | Age: 46
Discharge: HOME | End: 2021-11-10
Payer: COMMERCIAL

## 2021-11-10 VITALS
OXYGEN SATURATION: 98 % | HEART RATE: 74 BPM | TEMPERATURE: 98.1 F | HEIGHT: 75 IN | DIASTOLIC BLOOD PRESSURE: 85 MMHG | SYSTOLIC BLOOD PRESSURE: 162 MMHG | BODY MASS INDEX: 39.17 KG/M2 | RESPIRATION RATE: 16 BRPM | WEIGHT: 315 LBS

## 2021-11-10 DIAGNOSIS — U07.1 COVID-19: Primary | ICD-10-CM

## 2021-11-10 DIAGNOSIS — U07.1 COVID: ICD-10-CM

## 2021-11-10 LAB — THROAT/NOSE CULTURE: NORMAL

## 2021-11-10 PROCEDURE — 2580000003 HC RX 258: Performed by: FAMILY MEDICINE

## 2021-11-10 PROCEDURE — 96365 THER/PROPH/DIAG IV INF INIT: CPT

## 2021-11-10 PROCEDURE — 6360000002 HC RX W HCPCS: Performed by: FAMILY MEDICINE

## 2021-11-10 RX ORDER — SODIUM CHLORIDE 9 MG/ML
INJECTION, SOLUTION INTRAVENOUS CONTINUOUS
OUTPATIENT
Start: 2021-11-10

## 2021-11-10 RX ORDER — SODIUM CHLORIDE 9 MG/ML
INJECTION, SOLUTION INTRAVENOUS CONTINUOUS
Status: DISCONTINUED | OUTPATIENT
Start: 2021-11-10 | End: 2021-11-11 | Stop reason: HOSPADM

## 2021-11-10 RX ORDER — SODIUM CHLORIDE 0.9 % (FLUSH) 0.9 %
5-40 SYRINGE (ML) INJECTION PRN
Status: CANCELLED | OUTPATIENT
Start: 2021-11-10

## 2021-11-10 RX ORDER — DIPHENHYDRAMINE HYDROCHLORIDE 50 MG/ML
50 INJECTION INTRAMUSCULAR; INTRAVENOUS ONCE
Status: DISCONTINUED | OUTPATIENT
Start: 2021-11-10 | End: 2021-11-11 | Stop reason: HOSPADM

## 2021-11-10 RX ORDER — SODIUM CHLORIDE 9 MG/ML
25 INJECTION, SOLUTION INTRAVENOUS PRN
OUTPATIENT
Start: 2021-11-10

## 2021-11-10 RX ORDER — METHYLPREDNISOLONE SODIUM SUCCINATE 125 MG/2ML
125 INJECTION, POWDER, LYOPHILIZED, FOR SOLUTION INTRAMUSCULAR; INTRAVENOUS ONCE
Status: CANCELLED | OUTPATIENT
Start: 2021-11-10 | End: 2021-11-10

## 2021-11-10 RX ORDER — HEPARIN SODIUM (PORCINE) LOCK FLUSH IV SOLN 100 UNIT/ML 100 UNIT/ML
500 SOLUTION INTRAVENOUS PRN
OUTPATIENT
Start: 2021-11-10

## 2021-11-10 RX ORDER — HEPARIN SODIUM (PORCINE) LOCK FLUSH IV SOLN 100 UNIT/ML 100 UNIT/ML
500 SOLUTION INTRAVENOUS PRN
Status: DISCONTINUED | OUTPATIENT
Start: 2021-11-10 | End: 2021-11-11 | Stop reason: HOSPADM

## 2021-11-10 RX ORDER — SODIUM CHLORIDE 9 MG/ML
INJECTION, SOLUTION INTRAVENOUS CONTINUOUS
Status: CANCELLED | OUTPATIENT
Start: 2021-11-10

## 2021-11-10 RX ORDER — SODIUM CHLORIDE 9 MG/ML
25 INJECTION, SOLUTION INTRAVENOUS PRN
Status: CANCELLED | OUTPATIENT
Start: 2021-11-10

## 2021-11-10 RX ORDER — HEPARIN SODIUM (PORCINE) LOCK FLUSH IV SOLN 100 UNIT/ML 100 UNIT/ML
500 SOLUTION INTRAVENOUS PRN
Status: CANCELLED | OUTPATIENT
Start: 2021-11-10

## 2021-11-10 RX ORDER — DIPHENHYDRAMINE HYDROCHLORIDE 50 MG/ML
50 INJECTION INTRAMUSCULAR; INTRAVENOUS ONCE
Status: CANCELLED | OUTPATIENT
Start: 2021-11-10 | End: 2021-11-10

## 2021-11-10 RX ORDER — SODIUM CHLORIDE 0.9 % (FLUSH) 0.9 %
5-40 SYRINGE (ML) INJECTION PRN
Status: DISCONTINUED | OUTPATIENT
Start: 2021-11-10 | End: 2021-11-11 | Stop reason: HOSPADM

## 2021-11-10 RX ORDER — SODIUM CHLORIDE 0.9 % (FLUSH) 0.9 %
5-40 SYRINGE (ML) INJECTION PRN
OUTPATIENT
Start: 2021-11-10

## 2021-11-10 RX ORDER — DIPHENHYDRAMINE HYDROCHLORIDE 50 MG/ML
50 INJECTION INTRAMUSCULAR; INTRAVENOUS ONCE
OUTPATIENT
Start: 2021-11-10 | End: 2021-11-10

## 2021-11-10 RX ORDER — METHYLPREDNISOLONE SODIUM SUCCINATE 125 MG/2ML
125 INJECTION, POWDER, LYOPHILIZED, FOR SOLUTION INTRAMUSCULAR; INTRAVENOUS ONCE
OUTPATIENT
Start: 2021-11-10 | End: 2021-11-10

## 2021-11-10 RX ORDER — SODIUM CHLORIDE 9 MG/ML
25 INJECTION, SOLUTION INTRAVENOUS PRN
Status: DISCONTINUED | OUTPATIENT
Start: 2021-11-10 | End: 2021-11-11 | Stop reason: HOSPADM

## 2021-11-10 RX ORDER — METHYLPREDNISOLONE SODIUM SUCCINATE 125 MG/2ML
125 INJECTION, POWDER, LYOPHILIZED, FOR SOLUTION INTRAMUSCULAR; INTRAVENOUS ONCE
Status: DISCONTINUED | OUTPATIENT
Start: 2021-11-10 | End: 2021-11-11 | Stop reason: HOSPADM

## 2021-11-10 RX ORDER — EPINEPHRINE 1 MG/ML
0.3 INJECTION, SOLUTION, CONCENTRATE INTRAVENOUS PRN
OUTPATIENT
Start: 2021-11-10

## 2021-11-10 RX ADMIN — CASIRIVIMAB AND IMDEVIMAB: 600; 600 INJECTION, SOLUTION, CONCENTRATE INTRAVENOUS at 16:45

## 2021-11-10 NOTE — ED NOTES
Pt. Ambulatory to Exam 3 for outpt. renegeron infusion, pt. Has read EVU sheet and consents.       Ruddy Del Cid RN  11/10/21 8083

## 2021-11-10 NOTE — ED NOTES
Regeron infusion completed and tubing flushed with 0.9 NS. Pt. Brooklynn Mojica will need to observe for another hour, pt. Agreeable.       Tahir Arriaga RN  11/10/21 1900

## 2021-11-11 NOTE — ED NOTES
AVS given to pt. Pulse regular. Extremities warm. Respirations regular and quiet. Mucous membranes pink & moist. Alert and oriented times 3. No nausea or vomiting. Range of motion within patient's limits. Skin pink, warm and dry. Calm and cooperative.  _met___ Safety:       (Environmental)   [place] to environment   Ensure ID band is correct and in place/ allergy band as needed   Assess for fall risk   Initiate fall precautions as applicable (fall band, side rails, etc.)   Call light within reach   Bed in low position/ wheels locked    __met__ Pain:        Assess pain level and characteristics   Administer analgesics as ordered   Assess effectiveness of pain management and report to MD as needed    _met___ Knowledge Deficit:   Assess baseline knowledge   Provide teaching at level of understanding   Provide teaching via preferred learning method   Evaluate teaching effectiveness    _met___ Hemodynamic/Respiratory Status:       (Pre and Post Procedure Monitoring)   Assess/Monitor vital signs and LOC   Assess Baseline SpO2 prior to any sedation   Obtain weight/height   Assess vital signs/ LOC until patient meets discharge criteria   Monitor procedure site and notify MD of any issues    __           Judge Nicko RN  11/10/21 4152

## 2021-12-23 ENCOUNTER — HOSPITAL ENCOUNTER (OUTPATIENT)
Dept: GENERAL RADIOLOGY | Age: 46
Discharge: HOME | End: 2021-12-23
Payer: COMMERCIAL

## 2021-12-23 ENCOUNTER — HOSPITAL ENCOUNTER (OUTPATIENT)
Age: 46
Discharge: HOME | End: 2021-12-23
Payer: COMMERCIAL

## 2021-12-23 DIAGNOSIS — R06.02 SOB (SHORTNESS OF BREATH): ICD-10-CM

## 2021-12-23 PROCEDURE — 71046 X-RAY EXAM CHEST 2 VIEWS: CPT

## 2022-02-08 NOTE — PROGRESS NOTES
[address] [place] [address] Shivam Gibson  Dept: [phone]  Dept Fax: [phone]  Loc: [phone]    Tara Galeano is a 55 y.o. male who presents today for:  Chief Complaint   Patient presents with    Mass     noticed last thursday on lt lower leg           HPI:     HPI  Here for a lump on his left lower leg that he noticed 1 week ago. No pain no redness, no warmth. At the end of the day he feels a stretching in that area. Reviewed chart forpast medical history , surgical history , allergies, social history , family history and medications. Health Maintenance   Topic Date Due    Hepatitis C screen  Never done    Colon cancer screen colonoscopy  Never done    Lipid screen  12/30/2021    Potassium monitoring  12/30/2021    Creatinine monitoring  12/30/2021    Pneumococcal 0-64 years Vaccine (1 of 2 - PPSV23) 03/11/2022 (Originally 9/14/1981)    COVID-19 Vaccine (1) 09/10/2022 (Originally 9/14/1980)    Flu vaccine (1) 09/15/2022 (Originally 9/1/2021)    Depression Screen  03/11/2022    DTaP/Tdap/Td vaccine (2 - Td or Tdap) 07/31/2022    Diabetes screen  12/30/2023    Hepatitis A vaccine  Aged Out    Hepatitis B vaccine  Aged Out    Hib vaccine  Aged Out    Meningococcal (ACWY) vaccine  Aged Out    HIV screen  Discontinued       Subjective:      Constitutional:Negative for fever, chills, diaphoresis, activity change, appetite change and fatigue. HENT: Negative for hearing loss, ear pain, congestion, sore throat, rhinorrhea, postnasal drip and ear discharge. Eyes: Negative for photophobia and visual disturbance. Respiratory: Negative for cough, chest tightness, shortness of breath and wheezing. Cardiovascular: Negative for chest pain and leg swelling. Gastrointestinal: Negative for nausea, vomiting, abdominal pain, diarrhea and constipation.    Genitourinary: Negative for dysuria, urgency and frequency. Neurological: Negative for weakness, light-headedness and headaches. Psychiatric/Behavioral: Negative for sleep disturbance.      :     Vitals:    02/10/22 1403   BP: 138/82   Site: Right Lower Arm   Position: Sitting   Cuff Size: Medium Adult   Pulse: 78   Resp: 18   Temp: 97.5 °F (36.4 °C)   TempSrc: Temporal   SpO2: 100%   Weight: (!) 440 lb (199.6 kg)     Wt Readings from Last 3 Encounters:   02/10/22 (!) 440 lb (199.6 kg)   11/10/21 (!) 445 lb (201.9 kg)   09/15/21 (!) 446 lb (202.3 kg)       Physical Exam  Left lower leg with soft tissue lump in 3/4 way up the shin. Firm in nature but nontender    Chronic pedal edema wearing compression stockings every day    Assessment/Plan   Antolin Medina was seen today for mass. Diagnoses and all orders for this visit:    Mass of soft tissue of lower leg  -     US EXTREMITY LEFT NON VASC LIMITED; Future    Pedal edema  -     Elastic Bandages & Supports (JOBST KNEE HIGH COMPRESSION SM) MISC; 2 each by Does not apply route daily as needed (pedal edema)      Will treat further based on results    Call or return to clinic prn if these symptoms worsen or fail to improve as anticipated ie bigger or painful. Discussed use, benefit, and side effectsof prescribed medications. All patient questions answered. Pt voiced understanding. Reviewed health maintenance. Instructed to continue current medications, diet and exercise. Patient agreed with treatment plan. Followup as directed.      Electronically signed by Nilda Alaniz MD

## 2022-02-10 ENCOUNTER — TELEPHONE (OUTPATIENT)
Dept: FAMILY MEDICINE CLINIC | Age: 47
End: 2022-02-10

## 2022-02-10 ENCOUNTER — OFFICE VISIT (OUTPATIENT)
Dept: FAMILY MEDICINE CLINIC | Age: 47
End: 2022-02-10
Payer: COMMERCIAL

## 2022-02-10 ENCOUNTER — HOSPITAL ENCOUNTER (OUTPATIENT)
Dept: ULTRASOUND IMAGING | Age: 47
Discharge: HOME | End: 2022-02-10
Payer: COMMERCIAL

## 2022-02-10 VITALS
HEART RATE: 78 BPM | RESPIRATION RATE: 18 BRPM | BODY MASS INDEX: 55 KG/M2 | DIASTOLIC BLOOD PRESSURE: 82 MMHG | TEMPERATURE: 97.5 F | SYSTOLIC BLOOD PRESSURE: 138 MMHG | OXYGEN SATURATION: 100 % | WEIGHT: 315 LBS

## 2022-02-10 DIAGNOSIS — M79.89 MASS OF SOFT TISSUE OF LOWER LEG: Primary | ICD-10-CM

## 2022-02-10 DIAGNOSIS — M79.89 MASS OF SOFT TISSUE OF LOWER LEG: ICD-10-CM

## 2022-02-10 DIAGNOSIS — R60.0 PEDAL EDEMA: ICD-10-CM

## 2022-02-10 PROCEDURE — 99213 OFFICE O/P EST LOW 20 MIN: CPT | Performed by: FAMILY MEDICINE

## 2022-02-10 PROCEDURE — 76882 US LMTD JT/FCL EVL NVASC XTR: CPT

## 2022-02-10 RX ORDER — CEPHALEXIN 500 MG/1
500 CAPSULE ORAL 3 TIMES DAILY
Qty: 30 CAPSULE | Refills: 0 | Status: SHIPPED | OUTPATIENT
Start: 2022-02-10 | End: 2022-02-20

## 2022-02-10 NOTE — TELEPHONE ENCOUNTER
Superficial blood vessel with a clot  Use warm compresses TID and will start abx  If not better in 1 week then come back  Sooner if worse  Call patient

## 2022-03-04 DIAGNOSIS — M1A.0790 IDIOPATHIC CHRONIC GOUT OF FOOT WITHOUT TOPHUS, UNSPECIFIED LATERALITY: ICD-10-CM

## 2022-03-04 DIAGNOSIS — I10 ESSENTIAL HYPERTENSION: ICD-10-CM

## 2022-03-07 RX ORDER — HYDROCHLOROTHIAZIDE 25 MG/1
TABLET ORAL
Qty: 30 TABLET | Refills: 11 | Status: SHIPPED | OUTPATIENT
Start: 2022-03-07

## 2022-03-07 RX ORDER — ALLOPURINOL 100 MG/1
TABLET ORAL
Qty: 30 TABLET | Refills: 11 | Status: SHIPPED | OUTPATIENT
Start: 2022-03-07

## 2022-03-14 ENCOUNTER — NURSE ONLY (OUTPATIENT)
Dept: LAB | Age: 47
End: 2022-03-14

## 2022-03-14 DIAGNOSIS — K21.9 GASTROESOPHAGEAL REFLUX DISEASE WITHOUT ESOPHAGITIS: ICD-10-CM

## 2022-03-14 DIAGNOSIS — M1A.0790 IDIOPATHIC CHRONIC GOUT OF FOOT WITHOUT TOPHUS, UNSPECIFIED LATERALITY: ICD-10-CM

## 2022-03-14 DIAGNOSIS — Z00.00 ROUTINE GENERAL MEDICAL EXAMINATION AT HEALTH CARE FACILITY: ICD-10-CM

## 2022-03-14 DIAGNOSIS — E78.00 PURE HYPERCHOLESTEROLEMIA: ICD-10-CM

## 2022-03-14 DIAGNOSIS — E66.01 MORBID OBESITY WITH BMI OF 50.0-59.9, ADULT (HCC): ICD-10-CM

## 2022-03-14 LAB
ALBUMIN SERPL-MCNC: 4.5 G/DL (ref 3.5–5.1)
ALP BLD-CCNC: 73 U/L (ref 38–126)
ALT SERPL-CCNC: 43 U/L (ref 11–66)
ANION GAP SERPL CALCULATED.3IONS-SCNC: 12 MEQ/L (ref 8–16)
AST SERPL-CCNC: 28 U/L (ref 5–40)
AVERAGE GLUCOSE: 90 MG/DL (ref 70–126)
BILIRUB SERPL-MCNC: 0.5 MG/DL (ref 0.3–1.2)
BUN BLDV-MCNC: 14 MG/DL (ref 7–22)
CALCIUM SERPL-MCNC: 9.5 MG/DL (ref 8.5–10.5)
CHLORIDE BLD-SCNC: 105 MEQ/L (ref 98–111)
CHOLESTEROL, TOTAL: 119 MG/DL (ref 100–199)
CO2: 26 MEQ/L (ref 23–33)
CREAT SERPL-MCNC: 0.9 MG/DL (ref 0.4–1.2)
ERYTHROCYTE [DISTWIDTH] IN BLOOD BY AUTOMATED COUNT: 13.3 % (ref 11.5–14.5)
ERYTHROCYTE [DISTWIDTH] IN BLOOD BY AUTOMATED COUNT: 47.7 FL (ref 35–45)
GFR SERPL CREATININE-BSD FRML MDRD: > 90 ML/MIN/1.73M2
GLUCOSE FASTING: 104 MG/DL (ref 70–108)
HBA1C MFR BLD: 5 % (ref 4.4–6.4)
HCT VFR BLD CALC: 45.5 % (ref 42–52)
HDLC SERPL-MCNC: 39 MG/DL
HEMOGLOBIN: 14.5 GM/DL (ref 14–18)
LDL CHOLESTEROL CALCULATED: 56 MG/DL
MCH RBC QN AUTO: 31.2 PG (ref 26–33)
MCHC RBC AUTO-ENTMCNC: 31.9 GM/DL (ref 32.2–35.5)
MCV RBC AUTO: 97.8 FL (ref 80–94)
PLATELET # BLD: 152 THOU/MM3 (ref 130–400)
PMV BLD AUTO: 10.4 FL (ref 9.4–12.4)
POTASSIUM SERPL-SCNC: 4.4 MEQ/L (ref 3.5–5.2)
RBC # BLD: 4.65 MILL/MM3 (ref 4.7–6.1)
SODIUM BLD-SCNC: 143 MEQ/L (ref 135–145)
TOTAL PROTEIN: 7.2 G/DL (ref 6.1–8)
TRIGL SERPL-MCNC: 120 MG/DL (ref 0–199)
TSH SERPL DL<=0.05 MIU/L-ACNC: 1.76 UIU/ML (ref 0.4–4.2)
URIC ACID: 6.7 MG/DL (ref 3.7–7)
WBC # BLD: 6.4 THOU/MM3 (ref 4.8–10.8)

## 2022-03-30 NOTE — PROGRESS NOTES
[address] [facility]  Dept: [phone]  Dept Fax: [phone]  Loc: [phone]    Phyllis Andujar is a 55 y.o. male who presents today for:  Chief Complaint   Patient presents with    6 Month Follow-Up           HPI:     HPI    Hyperlipidemia: Patient presents with hyperlipidemia. He was tested because hypertension. His last labs showed   Lab Results   Component Value Date    CHOL 119 03/14/2022    CHOL 123 12/30/2020    CHOL 106 05/28/2019     Lab Results   Component Value Date    TRIG 120 03/14/2022    TRIG 152 12/30/2020    TRIG 131 05/28/2019     Lab Results   Component Value Date    HDL 39 03/14/2022    HDL 36 12/30/2020    HDL 38 05/28/2019     Lab Results   Component Value Date    LDLCALC 56 03/14/2022    [address] 57 12/30/2020    [address] 42 05/28/2019     No results found for: LABVLDL, VLDL  No results found for: CHOLHDLRATIO  There is a family history of hyperlipidemia. There is a family history of early ischemia heart disease. Hypertension: Patient here for follow-up of elevated blood pressure. He is exercising and is not adherent to low salt diet. Blood pressure is well controlled at home. Cardiac symptoms none. Patient denies chest pain, dyspnea and palpitations. Cardiovascular risk factors: none. Use of agents associated with hypertension: none. History of target organ damage: none. GERD well controlled on the PPI. BHARATHI on CPAP. Gout controlled on allopurinol. Reports that he has been smoking Cigarettes again. He has a 20.00 pack-year smoking history. He has never used smokeless tobacco.  Not ready to quit. Calling for ED since 5/2020. He is having trouble with his marriage that they are gong to counseling for. He is able to obtain an erection but soft and does not maintain to ejaculation. Morning erections still happening. Something similar happened a few years ago. Uses cialis prn which helps. Having more SOB and chest tightness. Refuses to quit smoking. EKG and cardiology workup within the last year was normal and cardiology released him to follow up only if symptoms were worse. Reviewed chart forpast medical history , surgical history , allergies, social history , family history and medications. Health Maintenance   Topic Date Due    Hepatitis C screen  Never done    Pneumococcal 0-64 years Vaccine (1 of 2 - PPSV23) Never done    Colorectal Cancer Screen  Never done    Depression Screen  03/11/2022    COVID-19 Vaccine (1) 09/10/2022 (Originally 9/14/1980)    Flu vaccine (1) 09/15/2022 (Originally 9/1/2021)    DTaP/Tdap/Td vaccine (2 - Td or Tdap) 07/31/2022    Lipid screen  03/14/2023    Potassium monitoring  03/14/2023    Creatinine monitoring  03/14/2023    Diabetes screen  03/14/2025    Hepatitis A vaccine  Aged Out    Hepatitis B vaccine  Aged Out    Hib vaccine  Aged Out    Meningococcal (ACWY) vaccine  Aged Out    HIV screen  Discontinued       Subjective:      Constitutional:Negative for fever, chills, diaphoresis, activity change, appetite change and fatigue. HENT: Negative for hearing loss, ear pain, congestion, sore throat, rhinorrhea, postnasal drip and ear discharge. Eyes: Negative for photophobia and visual disturbance. Respiratory: Negative for cough, chest tightness, shortness of breath and wheezing. Cardiovascular: Negative for chest pain and leg swelling. Gastrointestinal: Negative for nausea, vomiting, abdominal pain, diarrhea and constipation. Genitourinary: Negative for dysuria, urgency and frequency. Neurological: Negative for weakness, light-headedness and headaches.    Psychiatric/Behavioral: Negative for sleep disturbance.      :     Vitals:    03/31/22 1317   BP: 138/74   Site: Left Upper Arm   Position: Sitting   Cuff Size: Large Adult   Pulse: 93   Resp: 20   Temp: 96.9 °F (36.1 °C)   TempSrc: Temporal SpO2: 95%   Weight: (!) 448 lb (203.2 kg)   Height: 6' 3\" (1.905 m)     Wt Readings from Last 3 Encounters:   03/31/22 (!) 448 lb (203.2 kg)   02/10/22 (!) 440 lb (199.6 kg)   11/10/21 (!) 445 lb (201.9 kg)       Physical Exam  Constitutional: Vital signs are normal. He appears well-developed and well-nourished. He is active. HENT:   Head: Normocephalic and atraumatic. Right Ear: Tympanic membrane, external ear and ear canal normal. No drainage or tenderness. Left Ear: Tympanic membrane, external ear and ear canal normal. No drainage or tenderness. Nose: Nose normal. No mucosal edema or rhinorrhea. Mouth/Throat: Uvula is midline, oropharynx is clear and moist and mucous membranes are normal. Mucous membranes are not pale. Normal dentition. No posterior oropharyngeal edema or posterior oropharyngeal erythema. Eyes: Lids are normal. Right eye exhibits no chemosis and no discharge. Left eye exhibits no chemosis and no drainage. Right conjunctiva has no hemorrhage. Left conjunctiva has no hemorrhage. Right eye exhibits normal extraocular motion. Left eye exhibits normal extraocular motion. Right pupil is round and reactive. Left pupil is round and reactive. Pupils are equal.   Cardiovascular: Normal rate, regular rhythm, S1 normal, S2 normal and normal heart sounds. Exam reveals no gallop. No murmur heard. Pulmonary/Chest: Effort normal and breath sounds normal. No respiratory distress. He has no wheezes. He has no rhonchi. He has no rales. Abdominal: Soft. Normal appearance and bowel sounds are normal. He exhibits no distension and no mass. There is no hepatosplenomegaly. No tenderness. He has no rigidity, no rebound and no guarding. No hernia. Musculoskeletal:        Right lower leg: He exhibits no edema. Left lower leg: He exhibits no edema. Neurological: He is alert. Oriented and pleasent        Assessment/Plan   Marla Ansari was seen today for 6 month follow-up.     Diagnoses and all orders for this visit:    Essential hypertension  -     losartan (COZAAR) 50 MG tablet; TAKE ONE TABLET BY MOUTH TWICE A DAY    Idiopathic chronic gout of foot without tophus, unspecified laterality    Pedal edema    Anosmia    Other male erectile dysfunction    Tobacco abuse    Pseudotumor cerebri    Morbid obesity with BMI of 50.0-59.9, adult (HCC)    Dysmetabolic syndrome    Gastroesophageal reflux disease without esophagitis    Benign intracranial hypertension    Obstructive sleep apnea on CPAP    Pure hypercholesterolemia    Family history of premature ventricular contractions    Other fatigue  -     Glucose, Fasting; Future  -     Insulin, Fasting; Future  -     Vitamin D 25 Hydroxy; Future  -     Vitamin B12 & Folate; Future    Colon cancer screening  -     AFL - Jeremy Soliz MD, Gastroenterology, [facility]    Anxiety  -     sertraline (ZOLOFT) 50 MG tablet; Take 1 tablet by mouth daily    Continue healthy diet and exercise  Counseling through journaling or formal counseling    Consider event monitor for SOB sensation if the zoloft is not helping. Reccommended tobacco cessation options including pharmacologicmethods, counseled great than 3 minutes during this visit:  Yes  [x]  No  []    Patient given educational materials - see patient instructions. Discussed use, benefit, and side effectsof prescribed medications. All patient questions answered. Pt voiced understanding. Reviewed health maintenance. Instructed to continue current medications, diet and exercise. Patient agreed with treatment plan. Followup as directed.      Electronically signed by Leigh Beckwith MD

## 2022-03-31 ENCOUNTER — OFFICE VISIT (OUTPATIENT)
Dept: FAMILY MEDICINE CLINIC | Age: 47
End: 2022-03-31
Payer: COMMERCIAL

## 2022-03-31 VITALS
HEART RATE: 93 BPM | OXYGEN SATURATION: 95 % | HEIGHT: 75 IN | RESPIRATION RATE: 20 BRPM | SYSTOLIC BLOOD PRESSURE: 138 MMHG | WEIGHT: 315 LBS | DIASTOLIC BLOOD PRESSURE: 74 MMHG | TEMPERATURE: 96.9 F | BODY MASS INDEX: 39.17 KG/M2

## 2022-03-31 DIAGNOSIS — R43.0 ANOSMIA: ICD-10-CM

## 2022-03-31 DIAGNOSIS — Z72.0 TOBACCO ABUSE: ICD-10-CM

## 2022-03-31 DIAGNOSIS — E78.00 PURE HYPERCHOLESTEROLEMIA: ICD-10-CM

## 2022-03-31 DIAGNOSIS — G93.2 PSEUDOTUMOR CEREBRI: ICD-10-CM

## 2022-03-31 DIAGNOSIS — E88.81 DYSMETABOLIC SYNDROME: ICD-10-CM

## 2022-03-31 DIAGNOSIS — Z12.11 COLON CANCER SCREENING: ICD-10-CM

## 2022-03-31 DIAGNOSIS — M1A.0790 IDIOPATHIC CHRONIC GOUT OF FOOT WITHOUT TOPHUS, UNSPECIFIED LATERALITY: ICD-10-CM

## 2022-03-31 DIAGNOSIS — I10 ESSENTIAL HYPERTENSION: Primary | ICD-10-CM

## 2022-03-31 DIAGNOSIS — K21.9 GASTROESOPHAGEAL REFLUX DISEASE WITHOUT ESOPHAGITIS: ICD-10-CM

## 2022-03-31 DIAGNOSIS — F41.9 ANXIETY: ICD-10-CM

## 2022-03-31 DIAGNOSIS — Z82.49: ICD-10-CM

## 2022-03-31 DIAGNOSIS — N52.8 OTHER MALE ERECTILE DYSFUNCTION: ICD-10-CM

## 2022-03-31 DIAGNOSIS — R60.0 PEDAL EDEMA: ICD-10-CM

## 2022-03-31 DIAGNOSIS — G47.33 OBSTRUCTIVE SLEEP APNEA ON CPAP: ICD-10-CM

## 2022-03-31 DIAGNOSIS — R53.83 OTHER FATIGUE: ICD-10-CM

## 2022-03-31 DIAGNOSIS — E66.01 MORBID OBESITY WITH BMI OF 50.0-59.9, ADULT (HCC): ICD-10-CM

## 2022-03-31 DIAGNOSIS — G93.2 BENIGN INTRACRANIAL HYPERTENSION: ICD-10-CM

## 2022-03-31 DIAGNOSIS — Z99.89 OBSTRUCTIVE SLEEP APNEA ON CPAP: ICD-10-CM

## 2022-03-31 PROCEDURE — G8427 DOCREV CUR MEDS BY ELIG CLIN: HCPCS | Performed by: FAMILY MEDICINE

## 2022-03-31 PROCEDURE — G8417 CALC BMI ABV UP PARAM F/U: HCPCS | Performed by: FAMILY MEDICINE

## 2022-03-31 PROCEDURE — G8484 FLU IMMUNIZE NO ADMIN: HCPCS | Performed by: FAMILY MEDICINE

## 2022-03-31 PROCEDURE — 4004F PT TOBACCO SCREEN RCVD TLK: CPT | Performed by: FAMILY MEDICINE

## 2022-03-31 PROCEDURE — 99214 OFFICE O/P EST MOD 30 MIN: CPT | Performed by: FAMILY MEDICINE

## 2022-03-31 RX ORDER — LOSARTAN POTASSIUM 50 MG/1
TABLET ORAL
Qty: 60 TABLET | Refills: 11 | Status: SHIPPED | OUTPATIENT
Start: 2022-03-31

## 2022-03-31 RX ORDER — ATORVASTATIN CALCIUM 20 MG/1
TABLET, FILM COATED ORAL
Qty: 30 TABLET | Refills: 11 | Status: SHIPPED | OUTPATIENT
Start: 2022-03-31

## 2022-03-31 ASSESSMENT — PATIENT HEALTH QUESTIONNAIRE - PHQ9
SUM OF ALL RESPONSES TO PHQ QUESTIONS 1-9: 0
1. LITTLE INTEREST OR PLEASURE IN DOING THINGS: 0
2. FEELING DOWN, DEPRESSED OR HOPELESS: 0
SUM OF ALL RESPONSES TO PHQ QUESTIONS 1-9: 0
SUM OF ALL RESPONSES TO PHQ9 QUESTIONS 1 & 2: 0

## 2022-04-27 ENCOUNTER — OFFICE VISIT (OUTPATIENT)
Dept: PULMONOLOGY | Age: 47
End: 2022-04-27
Payer: COMMERCIAL

## 2022-04-27 VITALS
SYSTOLIC BLOOD PRESSURE: 134 MMHG | TEMPERATURE: 98.3 F | OXYGEN SATURATION: 98 % | HEIGHT: 75 IN | DIASTOLIC BLOOD PRESSURE: 84 MMHG | HEART RATE: 99 BPM | WEIGHT: 315 LBS | BODY MASS INDEX: 39.17 KG/M2

## 2022-04-27 DIAGNOSIS — G47.33 OBSTRUCTIVE SLEEP APNEA: Primary | ICD-10-CM

## 2022-04-27 DIAGNOSIS — E66.01 MORBID OBESITY WITH BMI OF 50.0-59.9, ADULT (HCC): ICD-10-CM

## 2022-04-27 PROCEDURE — G8427 DOCREV CUR MEDS BY ELIG CLIN: HCPCS | Performed by: PHYSICIAN ASSISTANT

## 2022-04-27 PROCEDURE — G8417 CALC BMI ABV UP PARAM F/U: HCPCS | Performed by: PHYSICIAN ASSISTANT

## 2022-04-27 PROCEDURE — 99214 OFFICE O/P EST MOD 30 MIN: CPT | Performed by: PHYSICIAN ASSISTANT

## 2022-04-27 PROCEDURE — 4004F PT TOBACCO SCREEN RCVD TLK: CPT | Performed by: PHYSICIAN ASSISTANT

## 2022-04-27 ASSESSMENT — ENCOUNTER SYMPTOMS
CHEST TIGHTNESS: 0
ALLERGIC/IMMUNOLOGIC NEGATIVE: 1
EYES NEGATIVE: 1
DIARRHEA: 0
NAUSEA: 0
BACK PAIN: 0
WHEEZING: 0
COUGH: 0
SHORTNESS OF BREATH: 0
STRIDOR: 0

## 2022-04-27 NOTE — PROGRESS NOTES
[facility]      Taryn Cleary         [id number]  4/27/2022   Chief Complaint   Patient presents with    1 Year Follow Up     BHARATHI with download         Pt of Dr. Nellie Bearden    PAP Download:   Verline Blades or initial AHI: 38.3     Date of initial study: 3/6/2011      Compliant  100%     Noncompliant 0 %     PAP Type Airsense 10 autoset Level  7   Avg Hrs/Day 7 hr 5 min  AHI: 1.6   Recorded compliance dates , 3/27/2022  to 4/25/2022   Machine/Mfg:   [x] ResMed    [] Respironics/Dreamstation   Interface:   [] Nasal    [] Nasal pillows   [x] FFM      Provider:      [x] SR-[facility]ME     []Ramo     [] Gabe    [] Cierra Ghotra    [] [place]ans               [] [facility]      [] Adaptive    [] [address]      [] Other    Neck Size: 18.5  Mallampati Mallampati 4  ESS:    SAQLI:     Here is a scan of the most recent download:            Presentation:   Mami Doe presents for sleep medicine follow up for obstructive sleep apnea  Since the last visit, Mami Doe is doing well with PAP. He is sleeping well and feels rested. Mask fits well. Weight is up. Equipment issues: The pressure is  acceptable, the mask is acceptable     Sleep issues:  Do you feel better? Yes  More rested? Yes   Better concentration? yes    Progress History:   Since last visit any new medical issues? No  New ER or hospital visits? No  Any new or changes in medicines? No  Any new sleep medicines? No    Review of Systems -   Review of Systems   Constitutional: Negative for activity change, appetite change, chills and fever. HENT: Negative for congestion and postnasal drip. Eyes: Negative. Respiratory: Negative for cough, chest tightness, shortness of breath, wheezing and stridor. Cardiovascular: Negative for chest pain and leg swelling. Gastrointestinal: Negative for diarrhea and nausea. Endocrine: Negative. Genitourinary: Negative. Musculoskeletal: Negative. Negative for arthralgias and back pain.    Skin: Negative. Allergic/Immunologic: Negative. Neurological: Negative. Negative for dizziness and light-headedness. Psychiatric/Behavioral: Negative. All other systems reviewed and are negative. Physical Exam:    BMI:  Body mass index is 57.17 kg/m². Wt Readings from Last 3 Encounters:   04/27/22 (!) 457 lb 6.4 oz (207.5 kg)   03/31/22 (!) 448 lb (203.2 kg)   02/10/22 (!) 440 lb (199.6 kg)     Weight gained 17 lbs over 1 year  Vitals: /84 (Site: Left Upper Arm, Position: Sitting, Cuff Size: Large Adult)   Pulse 99   Temp 98.3 °F (36.8 °C) (Temporal)   Ht 6' 3\" (1.905 m)   Wt (!) 457 lb 6.4 oz (207.5 kg)   SpO2 98%   BMI 57.17 kg/m²       Physical Exam  Constitutional:       Appearance: Normal appearance. He is normal weight. HENT:      Head: Normocephalic and atraumatic. Right Ear: External ear normal.      Left Ear: External ear normal.      Nose: Nose normal.   Eyes:      Extraocular Movements: Extraocular movements intact. Conjunctiva/sclera: Conjunctivae normal.      Pupils: Pupils are equal, round, and reactive to light. Pulmonary:      Effort: Pulmonary effort is normal.   Musculoskeletal:      Cervical back: Normal range of motion and neck supple. Neurological:      General: No focal deficit present. Mental Status: He is alert and oriented to person, place, and time. Psychiatric:         Attention and Perception: Attention and perception normal.         Mood and Affect: Mood and affect normal.         Speech: Speech normal.         Behavior: Behavior normal. Behavior is cooperative. Thought Content: Thought content normal.         Cognition and Memory: Cognition normal.         Judgment: Judgment normal.           ASSESSMENT/DIAGNOSIS     Diagnosis Orders   1. Obstructive sleep apnea     2. Morbid obesity with BMI of 50.0-59.9, adult [facility])              Plan   Do you need any equipment today?  Yes update supplies  - weight is up, needs to follow with PCP regarding weight loss options   - Download reviewed and discussed with patient  - He  was advised to continue current positive airway pressure therapy with above described pressure. - He  advised to keep good compliance with current recommended pressure to get optimal results and clinical improvement  - Recommend 7-9 hours of sleep with PAP  - He was advised to call [employer] regarding supplies if needed.   -He call my office for earlier appointment if needed for worsening of sleep symptoms.   - He was instructed on weight loss  - Conception Hunger was educated about my impression and plan. Patient verbalizesunderstanding.   We will see Margarito Loja back in: 1 year with download    Information added by my medical assistant/LPN was reviewed today         Mario Rapp PA-C, MPAS  4/27/2022

## 2023-02-23 DIAGNOSIS — M1A.0790 IDIOPATHIC CHRONIC GOUT OF FOOT WITHOUT TOPHUS, UNSPECIFIED LATERALITY: ICD-10-CM

## 2023-02-23 DIAGNOSIS — I10 ESSENTIAL HYPERTENSION: ICD-10-CM

## 2023-02-23 RX ORDER — ALLOPURINOL 100 MG/1
TABLET ORAL
Qty: 30 TABLET | Refills: 11 | Status: SHIPPED | OUTPATIENT
Start: 2023-02-23

## 2023-02-23 RX ORDER — HYDROCHLOROTHIAZIDE 25 MG/1
TABLET ORAL
Qty: 30 TABLET | Refills: 11 | Status: SHIPPED | OUTPATIENT
Start: 2023-02-23

## 2023-02-23 NOTE — TELEPHONE ENCOUNTER
----- Message from Brain Kavya sent at 2/23/2023  3:30 PM EST -----  Subject: Refill Request    QUESTIONS  Name of Medication? allopurinol (ZYLOPRIM) 100 MG tablet  Patient-reported dosage and instructions? once day   How many days do you have left? 5  Preferred Pharmacy? [facility] phone number (if available)? [phone]  Additional Information for Provider? Patient made his appt for March 22   that was the soonest he could get in . If need to fit him in sooner call   him back. ---------------------------------------------------------------------------  --------------,  Name of Medication? hydroCHLOROthiazide (HYDRODIURIL) 25 MG tablet  Patient-reported dosage and instructions? once day   How many days do you have left? 5  Preferred Pharmacy? [facility] [facility] phone number (if available)? [phone]  Additional Information for Provider? Patient made appt for march 22 and   needs these meds soon. If need sooner appt call him back . ---------------------------------------------------------------------------  --------------  Sneha MONTELONGO  What is the best way for the office to contact you? OK to leave message on   voicemail  Preferred Call Back Phone Number? [phone]  ---------------------------------------------------------------------------  --------------  SCRIPT ANSWERS  Relationship to Patient?  Self

## 2023-03-21 SDOH — ECONOMIC STABILITY: FOOD INSECURITY: WITHIN THE PAST 12 MONTHS, THE FOOD YOU BOUGHT JUST DIDN'T LAST AND YOU DIDN'T HAVE MONEY TO GET MORE.: NEVER TRUE

## 2023-03-21 SDOH — ECONOMIC STABILITY: HOUSING INSECURITY
IN THE LAST 12 MONTHS, WAS THERE A TIME WHEN YOU DID NOT HAVE A STEADY PLACE TO SLEEP OR SLEPT IN A SHELTER (INCLUDING NOW)?: NO

## 2023-03-21 SDOH — ECONOMIC STABILITY: FOOD INSECURITY: WITHIN THE PAST 12 MONTHS, YOU WORRIED THAT YOUR FOOD WOULD RUN OUT BEFORE YOU GOT MONEY TO BUY MORE.: NEVER TRUE

## 2023-03-21 SDOH — ECONOMIC STABILITY: INCOME INSECURITY: HOW HARD IS IT FOR YOU TO PAY FOR THE VERY BASICS LIKE FOOD, HOUSING, MEDICAL CARE, AND HEATING?: SOMEWHAT HARD

## 2023-03-21 SDOH — ECONOMIC STABILITY: TRANSPORTATION INSECURITY
IN THE PAST 12 MONTHS, HAS LACK OF TRANSPORTATION KEPT YOU FROM MEETINGS, WORK, OR FROM GETTING THINGS NEEDED FOR DAILY LIVING?: NO

## 2023-03-21 NOTE — PROGRESS NOTES
[facility]  Dept: [phone]  Dept Fax: [phone]  Loc: [phone]    Gonzalez Ness is a 52 y.o. male who presents today for:  Chief Complaint   Patient presents with    6 Month Follow-Up       HPI:     HPI  Yearly checkup. Hyperlipidemia: Patient presents with hyperlipidemia. He was tested because hypertension. His last labs showed   Lab Results   Component Value Date    CHOL 119 03/14/2022    CHOL 123 12/30/2020    CHOL 106 05/28/2019     Lab Results   Component Value Date    TRIG 120 03/14/2022    TRIG 152 12/30/2020    TRIG 131 05/28/2019     Lab Results   Component Value Date    HDL 39 03/14/2022    HDL 36 12/30/2020    HDL 38 05/28/2019     Lab Results   Component Value Date    LDLCALC 56 03/14/2022    [address] 57 12/30/2020    [address] 42 05/28/2019     No results found for: LABVLDL, VLDL  No results found for: CHOLHDLRATIO  There is a family history of hyperlipidemia. There is a family history of early ischemia heart disease. Hypertension: Patient here for follow-up of elevated blood pressure. He is exercising and is not adherent to low salt diet. Blood pressure is well controlled at home. Cardiac symptoms none. Patient denies chest pain, dyspnea and palpitations. Cardiovascular risk factors: none. Use of agents associated with hypertension: none. History of target organ damage: none. GERD well controlled on the PPI. BHARATHI on CPAP. Gout controlled on allopurinol. Reports that he has been smoking Cigarettes again. He has a 20.00 pack-year smoking history. He has never used smokeless tobacco.  Not ready to quit. Calling for ED since 5/2020. He is having trouble with his marriage that they are gong to counseling for. He is able to obtain an erection but soft and does not maintain to ejaculation. Morning erections still happening.   Something similar happened a few

## 2023-03-22 ENCOUNTER — OFFICE VISIT (OUTPATIENT)
Dept: FAMILY MEDICINE CLINIC | Age: 48
End: 2023-03-22
Payer: COMMERCIAL

## 2023-03-22 VITALS
WEIGHT: 315 LBS | TEMPERATURE: 97 F | BODY MASS INDEX: 58.87 KG/M2 | SYSTOLIC BLOOD PRESSURE: 158 MMHG | RESPIRATION RATE: 16 BRPM | OXYGEN SATURATION: 99 % | HEART RATE: 88 BPM | DIASTOLIC BLOOD PRESSURE: 90 MMHG

## 2023-03-22 DIAGNOSIS — E66.01 MORBID OBESITY WITH BMI OF 50.0-59.9, ADULT (HCC): ICD-10-CM

## 2023-03-22 DIAGNOSIS — Z72.0 TOBACCO ABUSE: ICD-10-CM

## 2023-03-22 DIAGNOSIS — N52.8 OTHER MALE ERECTILE DYSFUNCTION: ICD-10-CM

## 2023-03-22 DIAGNOSIS — G93.2 BENIGN INTRACRANIAL HYPERTENSION: ICD-10-CM

## 2023-03-22 DIAGNOSIS — M79.89 MASS OF SOFT TISSUE OF LOWER LEG: ICD-10-CM

## 2023-03-22 DIAGNOSIS — K21.9 GASTROESOPHAGEAL REFLUX DISEASE WITHOUT ESOPHAGITIS: ICD-10-CM

## 2023-03-22 DIAGNOSIS — E88.81 DYSMETABOLIC SYNDROME: ICD-10-CM

## 2023-03-22 DIAGNOSIS — Z99.89 OBSTRUCTIVE SLEEP APNEA ON CPAP: ICD-10-CM

## 2023-03-22 DIAGNOSIS — M1A.0790 IDIOPATHIC CHRONIC GOUT OF FOOT WITHOUT TOPHUS, UNSPECIFIED LATERALITY: ICD-10-CM

## 2023-03-22 DIAGNOSIS — Z82.49: ICD-10-CM

## 2023-03-22 DIAGNOSIS — I10 ESSENTIAL HYPERTENSION: Primary | ICD-10-CM

## 2023-03-22 DIAGNOSIS — G93.2 PSEUDOTUMOR CEREBRI: ICD-10-CM

## 2023-03-22 DIAGNOSIS — R53.83 OTHER FATIGUE: ICD-10-CM

## 2023-03-22 DIAGNOSIS — E78.00 PURE HYPERCHOLESTEROLEMIA: ICD-10-CM

## 2023-03-22 DIAGNOSIS — G47.33 OBSTRUCTIVE SLEEP APNEA ON CPAP: ICD-10-CM

## 2023-03-22 DIAGNOSIS — R60.0 PEDAL EDEMA: ICD-10-CM

## 2023-03-22 DIAGNOSIS — F41.9 ANXIETY: ICD-10-CM

## 2023-03-22 DIAGNOSIS — Z00.00 ROUTINE GENERAL MEDICAL EXAMINATION AT A HEALTH CARE FACILITY: ICD-10-CM

## 2023-03-22 PROCEDURE — 99396 PREV VISIT EST AGE 40-64: CPT | Performed by: FAMILY MEDICINE

## 2023-03-22 PROCEDURE — 3074F SYST BP LT 130 MM HG: CPT | Performed by: FAMILY MEDICINE

## 2023-03-22 PROCEDURE — G8484 FLU IMMUNIZE NO ADMIN: HCPCS | Performed by: FAMILY MEDICINE

## 2023-03-22 PROCEDURE — 3078F DIAST BP <80 MM HG: CPT | Performed by: FAMILY MEDICINE

## 2023-03-22 RX ORDER — LOSARTAN POTASSIUM 100 MG/1
TABLET ORAL
Qty: 90 TABLET | Refills: 3 | Status: SHIPPED | OUTPATIENT
Start: 2023-03-22 | End: 2023-03-23 | Stop reason: DRUGHIGH

## 2023-03-22 RX ORDER — ALLOPURINOL 100 MG/1
TABLET ORAL
Qty: 90 TABLET | Refills: 11 | Status: SHIPPED | OUTPATIENT
Start: 2023-03-22

## 2023-03-22 ASSESSMENT — PATIENT HEALTH QUESTIONNAIRE - PHQ9
SUM OF ALL RESPONSES TO PHQ QUESTIONS 1-9: 0
SUM OF ALL RESPONSES TO PHQ9 QUESTIONS 1 & 2: 0
1. LITTLE INTEREST OR PLEASURE IN DOING THINGS: 0
SUM OF ALL RESPONSES TO PHQ QUESTIONS 1-9: 0
2. FEELING DOWN, DEPRESSED OR HOPELESS: 0

## 2023-03-22 NOTE — Clinical Note
Clarify to see that he was taking the losartan 50 mg BID. If so then we will add another medication. If not then we will increase to BID.

## 2023-03-23 ENCOUNTER — TELEPHONE (OUTPATIENT)
Dept: FAMILY MEDICINE CLINIC | Age: 48
End: 2023-03-23

## 2023-03-23 DIAGNOSIS — I10 ESSENTIAL HYPERTENSION: ICD-10-CM

## 2023-03-23 RX ORDER — AMLODIPINE BESYLATE 5 MG/1
5 TABLET ORAL DAILY
Qty: 90 TABLET | Refills: 3 | Status: SHIPPED | OUTPATIENT
Start: 2023-03-23

## 2023-03-23 RX ORDER — LOSARTAN POTASSIUM 50 MG/1
TABLET ORAL
Qty: 180 TABLET | Refills: 3 | Status: SHIPPED
Start: 2023-03-23

## 2023-03-23 NOTE — TELEPHONE ENCOUNTER
[facility] called wanting to clarify the pt's losartan script. He was on 50 mg BID. The new order is for 100 mg BID. Is it supposed to be 100 mg BID now? They also wanted to change the quantity to 180 versus 90, that way the pt would have enough each refill. Please advise.

## 2023-03-28 DIAGNOSIS — E78.00 PURE HYPERCHOLESTEROLEMIA: ICD-10-CM

## 2023-03-28 RX ORDER — ATORVASTATIN CALCIUM 20 MG/1
TABLET, FILM COATED ORAL
Qty: 30 TABLET | Refills: 11 | Status: SHIPPED | OUTPATIENT
Start: 2023-03-28

## 2023-03-31 ENCOUNTER — NURSE ONLY (OUTPATIENT)
Dept: LAB | Age: 48
End: 2023-03-31

## 2023-03-31 DIAGNOSIS — M1A.0790 IDIOPATHIC CHRONIC GOUT OF FOOT WITHOUT TOPHUS, UNSPECIFIED LATERALITY: ICD-10-CM

## 2023-03-31 DIAGNOSIS — Z00.00 ROUTINE GENERAL MEDICAL EXAMINATION AT A HEALTH CARE FACILITY: ICD-10-CM

## 2023-03-31 DIAGNOSIS — R53.83 OTHER FATIGUE: ICD-10-CM

## 2023-03-31 DIAGNOSIS — E66.01 MORBID OBESITY WITH BMI OF 50.0-59.9, ADULT (HCC): ICD-10-CM

## 2023-03-31 LAB
25(OH)D3 SERPL-MCNC: 21 NG/ML (ref 30–100)
ALBUMIN SERPL BCG-MCNC: 4.3 G/DL (ref 3.5–5.1)
ALP SERPL-CCNC: 81 U/L (ref 38–126)
ALT SERPL W/O P-5'-P-CCNC: 42 U/L (ref 11–66)
ANION GAP SERPL CALC-SCNC: 9 MEQ/L (ref 8–16)
AST SERPL-CCNC: 28 U/L (ref 5–40)
BILIRUB SERPL-MCNC: 0.5 MG/DL (ref 0.3–1.2)
BUN SERPL-MCNC: 14 MG/DL (ref 7–22)
CALCIUM SERPL-MCNC: 9.2 MG/DL (ref 8.5–10.5)
CHLORIDE SERPL-SCNC: 106 MEQ/L (ref 98–111)
CHOLEST SERPL-MCNC: 114 MG/DL (ref 100–199)
CO2 SERPL-SCNC: 30 MEQ/L (ref 23–33)
CREAT SERPL-MCNC: 0.9 MG/DL (ref 0.4–1.2)
DEPRECATED MEAN GLUCOSE BLD GHB EST-ACNC: 93 MG/DL (ref 70–126)
DEPRECATED RDW RBC AUTO: 46 FL (ref 35–45)
ERYTHROCYTE [DISTWIDTH] IN BLOOD BY AUTOMATED COUNT: 13.1 % (ref 11.5–14.5)
FOLATE SERPL-MCNC: 6.8 NG/ML (ref 4.8–24.2)
GFR SERPL CREATININE-BSD FRML MDRD: > 60 ML/MIN/1.73M2
GLUCOSE FASTING: 112 MG/DL (ref 70–108)
HBA1C MFR BLD HPLC: 5.1 % (ref 4.4–6.4)
HCT VFR BLD AUTO: 44.2 % (ref 42–52)
HDLC SERPL-MCNC: 36 MG/DL
HGB BLD-MCNC: 14.4 GM/DL (ref 14–18)
INSULIN SERPL-ACNC: 41.9 MU/L
LDLC SERPL CALC-MCNC: 59 MG/DL
MCH RBC QN AUTO: 31.3 PG (ref 26–33)
MCHC RBC AUTO-ENTMCNC: 32.6 GM/DL (ref 32.2–35.5)
MCV RBC AUTO: 96.1 FL (ref 80–94)
PLATELET # BLD AUTO: 163 THOU/MM3 (ref 130–400)
PMV BLD AUTO: 10.3 FL (ref 9.4–12.4)
POTASSIUM SERPL-SCNC: 4.3 MEQ/L (ref 3.5–5.2)
PROT SERPL-MCNC: 6.7 G/DL (ref 6.1–8)
RBC # BLD AUTO: 4.6 MILL/MM3 (ref 4.7–6.1)
SODIUM SERPL-SCNC: 145 MEQ/L (ref 135–145)
TRIGL SERPL-MCNC: 93 MG/DL (ref 0–199)
TSH SERPL DL<=0.005 MIU/L-ACNC: 1.78 UIU/ML (ref 0.4–4.2)
URATE SERPL-MCNC: 6.6 MG/DL (ref 3.7–7)
VIT B12 SERPL-MCNC: 409 PG/ML (ref 211–911)
WBC # BLD AUTO: 5.3 THOU/MM3 (ref 4.8–10.8)

## 2023-04-01 ENCOUNTER — TELEPHONE (OUTPATIENT)
Dept: FAMILY MEDICINE CLINIC | Age: 48
End: 2023-04-01

## 2023-04-01 DIAGNOSIS — E88.81 INSULIN RESISTANCE: ICD-10-CM

## 2023-04-01 DIAGNOSIS — E66.01 MORBID OBESITY WITH BMI OF 50.0-59.9, ADULT (HCC): Primary | ICD-10-CM

## 2023-04-01 DIAGNOSIS — E55.9 VITAMIN D DEFICIENCY: ICD-10-CM

## 2023-04-01 RX ORDER — MELATONIN
2000 DAILY
Qty: 189 TABLET | Refills: 1 | Status: SHIPPED | OUTPATIENT
Start: 2023-04-01

## 2023-04-01 NOTE — TELEPHONE ENCOUNTER
Insulin resistance on labs (prediabetes)  Start metformin and recheck in 3 months    Vitamin D low  Take d3 2000 IU daily  Recheck in 3 months    Schedule recheck after the labs are done in 3 months    Call patient

## 2023-04-05 ENCOUNTER — NURSE ONLY (OUTPATIENT)
Dept: FAMILY MEDICINE CLINIC | Age: 48
End: 2023-04-05
Payer: COMMERCIAL

## 2023-04-05 VITALS — HEART RATE: 82 BPM | DIASTOLIC BLOOD PRESSURE: 68 MMHG | SYSTOLIC BLOOD PRESSURE: 117 MMHG

## 2023-04-05 DIAGNOSIS — I10 ESSENTIAL HYPERTENSION: Primary | ICD-10-CM

## 2023-04-05 PROCEDURE — 99211 OFF/OP EST MAY X REQ PHY/QHP: CPT | Performed by: FAMILY MEDICINE

## 2023-04-05 PROCEDURE — 3074F SYST BP LT 130 MM HG: CPT | Performed by: FAMILY MEDICINE

## 2023-04-05 PROCEDURE — 3078F DIAST BP <80 MM HG: CPT | Performed by: FAMILY MEDICINE

## 2023-04-05 NOTE — PROGRESS NOTES
Chief Complaint   Patient presents with    Blood Pressure Check         Vitals:    04/05/23 1537   BP: 117/68   Pulse: 82     No changes to med      Call patient

## 2023-05-02 ENCOUNTER — OFFICE VISIT (OUTPATIENT)
Dept: PULMONOLOGY | Age: 48
End: 2023-05-02
Payer: COMMERCIAL

## 2023-05-02 VITALS
DIASTOLIC BLOOD PRESSURE: 90 MMHG | OXYGEN SATURATION: 99 % | HEIGHT: 75 IN | BODY MASS INDEX: 39.17 KG/M2 | HEART RATE: 98 BPM | SYSTOLIC BLOOD PRESSURE: 138 MMHG | WEIGHT: 315 LBS

## 2023-05-02 DIAGNOSIS — E66.01 MORBID OBESITY WITH BMI OF 50.0-59.9, ADULT (HCC): ICD-10-CM

## 2023-05-02 DIAGNOSIS — G47.33 OBSTRUCTIVE SLEEP APNEA: Primary | ICD-10-CM

## 2023-05-02 PROCEDURE — 3075F SYST BP GE 130 - 139MM HG: CPT | Performed by: PHYSICIAN ASSISTANT

## 2023-05-02 PROCEDURE — G8427 DOCREV CUR MEDS BY ELIG CLIN: HCPCS | Performed by: PHYSICIAN ASSISTANT

## 2023-05-02 PROCEDURE — G8417 CALC BMI ABV UP PARAM F/U: HCPCS | Performed by: PHYSICIAN ASSISTANT

## 2023-05-02 PROCEDURE — 4004F PT TOBACCO SCREEN RCVD TLK: CPT | Performed by: PHYSICIAN ASSISTANT

## 2023-05-02 PROCEDURE — 3080F DIAST BP >= 90 MM HG: CPT | Performed by: PHYSICIAN ASSISTANT

## 2023-05-02 PROCEDURE — 99213 OFFICE O/P EST LOW 20 MIN: CPT | Performed by: PHYSICIAN ASSISTANT

## 2023-05-02 ASSESSMENT — ENCOUNTER SYMPTOMS
BACK PAIN: 0
CHEST TIGHTNESS: 0
COUGH: 0
WHEEZING: 0
SHORTNESS OF BREATH: 0
NAUSEA: 0
EYES NEGATIVE: 1
DIARRHEA: 0
STRIDOR: 0
ALLERGIC/IMMUNOLOGIC NEGATIVE: 1

## 2023-05-02 NOTE — PROGRESS NOTES
[facility]      Guy Lopez         [id number]  5/2/2023   Chief Complaint   Patient presents with    Follow-up     1 year BHARATHI follow up with download. Pt of Dr. Louis Nair    PAP Download:   Original or initial AHI: 38.3     Date of initial study: 3/6/2011      Compliant  100%   Noncompliant 0%     PAP Type CPAP Level  7cmH20   Avg Hrs/Day 7 hours 59 minutes  AHI: 1.0   Recorded compliance dates 4/1/23-4/30/23  Machine/Mfg:   [x] ResMed    [] Respironics/Dreamstation   Interface:   [] Nasal    [] Nasal pillows   [x] FFM      Provider:      [x] -JULIÁN     []Ramo     [] Gabe    [] Isaac Hashimoto    [] Deon               [] [facility] [facility]      [] Adaptive    [] [address]      [] Other    Neck Size: 19 inches  Mallampati 4  ESS:  2  SAQLI: 83    Here is a scan of the most recent download:            Presentation:   Yayo Hurtado presents for sleep medicine follow up for obstructive sleep apnea  Since the last visit, Yayo Hurtado is doing well with PAP. He is sleeping well and feels rested. Equipment issues: The pressure is  acceptable, the mask is acceptable     Sleep issues:  Do you feel better? Yes  More rested? Yes   Better concentration? yes    Progress History:   Since last visit any new medical issues? No  New ER or hospital visits? No  Any new or changes in medicines? No  Any new sleep medicines? No    Review of Systems -   Review of Systems   Constitutional:  Negative for activity change, appetite change, chills and fever. HENT:  Negative for congestion and postnasal drip. Eyes: Negative. Respiratory:  Negative for cough, chest tightness, shortness of breath, wheezing and stridor. Cardiovascular:  Negative for chest pain and leg swelling. Gastrointestinal:  Negative for diarrhea and nausea. Endocrine: Negative. Genitourinary: Negative. Musculoskeletal: Negative. Negative for arthralgias and back pain. Skin: Negative.     Allergic/Immunologic:

## 2023-07-07 ENCOUNTER — NURSE ONLY (OUTPATIENT)
Dept: LAB | Age: 48
End: 2023-07-07

## 2023-07-07 DIAGNOSIS — R73.9 HYPERGLYCEMIA: ICD-10-CM

## 2023-07-07 LAB
ALBUMIN SERPL BCG-MCNC: 4.5 G/DL (ref 3.5–5.1)
ALP SERPL-CCNC: 86 U/L (ref 38–126)
ALT SERPL W/O P-5'-P-CCNC: 37 U/L (ref 11–66)
ANION GAP SERPL CALC-SCNC: 18 MEQ/L (ref 8–16)
AST SERPL-CCNC: 28 U/L (ref 5–40)
BILIRUB SERPL-MCNC: 0.5 MG/DL (ref 0.3–1.2)
BUN SERPL-MCNC: 16 MG/DL (ref 7–22)
CALCIUM SERPL-MCNC: 9.1 MG/DL (ref 8.5–10.5)
CHLORIDE SERPL-SCNC: 107 MEQ/L (ref 98–111)
CO2 SERPL-SCNC: 22 MEQ/L (ref 23–33)
CREAT SERPL-MCNC: 0.9 MG/DL (ref 0.4–1.2)
DEPRECATED MEAN GLUCOSE BLD GHB EST-ACNC: 90 MG/DL (ref 70–126)
GFR SERPL CREATININE-BSD FRML MDRD: > 60 ML/MIN/1.73M2
GLUCOSE SERPL-MCNC: 108 MG/DL (ref 70–108)
HBA1C MFR BLD HPLC: 5 % (ref 4.4–6.4)
POTASSIUM SERPL-SCNC: 4.5 MEQ/L (ref 3.5–5.2)
PROT SERPL-MCNC: 6.8 G/DL (ref 6.1–8)
SODIUM SERPL-SCNC: 147 MEQ/L (ref 135–145)

## 2023-07-08 LAB — FRUCTOSAMINE SERPL-SCNC: 210 UMOL/L (ref 205–285)

## 2023-07-11 ENCOUNTER — NURSE ONLY (OUTPATIENT)
Dept: LAB | Age: 48
End: 2023-07-11

## 2023-07-12 LAB — GLUCOSE SERPL-MCNC: 107 MG/DL (ref 70–108)

## 2023-08-25 DIAGNOSIS — I10 ESSENTIAL HYPERTENSION: ICD-10-CM

## 2023-08-28 RX ORDER — LOSARTAN POTASSIUM 50 MG/1
TABLET ORAL
Qty: 180 TABLET | Refills: 3 | Status: SHIPPED | OUTPATIENT
Start: 2023-08-28

## 2023-10-02 ENCOUNTER — OFFICE VISIT (OUTPATIENT)
Dept: FAMILY MEDICINE CLINIC | Age: 48
End: 2023-10-02

## 2023-10-02 VITALS
RESPIRATION RATE: 18 BRPM | DIASTOLIC BLOOD PRESSURE: 88 MMHG | TEMPERATURE: 97.3 F | WEIGHT: 315 LBS | OXYGEN SATURATION: 97 % | SYSTOLIC BLOOD PRESSURE: 140 MMHG | HEART RATE: 86 BPM | BODY MASS INDEX: 39.17 KG/M2 | HEIGHT: 75 IN

## 2023-10-02 DIAGNOSIS — Z72.0 TOBACCO ABUSE: ICD-10-CM

## 2023-10-02 DIAGNOSIS — E55.9 VITAMIN D DEFICIENCY: ICD-10-CM

## 2023-10-02 DIAGNOSIS — G93.2 PSEUDOTUMOR CEREBRI: ICD-10-CM

## 2023-10-02 DIAGNOSIS — E78.00 PURE HYPERCHOLESTEROLEMIA: ICD-10-CM

## 2023-10-02 DIAGNOSIS — N52.8 OTHER MALE ERECTILE DYSFUNCTION: ICD-10-CM

## 2023-10-02 DIAGNOSIS — K21.9 GASTROESOPHAGEAL REFLUX DISEASE WITHOUT ESOPHAGITIS: ICD-10-CM

## 2023-10-02 DIAGNOSIS — Z82.49: ICD-10-CM

## 2023-10-02 DIAGNOSIS — R60.0 PEDAL EDEMA: ICD-10-CM

## 2023-10-02 DIAGNOSIS — G93.2 BENIGN INTRACRANIAL HYPERTENSION: ICD-10-CM

## 2023-10-02 DIAGNOSIS — G47.33 OBSTRUCTIVE SLEEP APNEA ON CPAP: ICD-10-CM

## 2023-10-02 DIAGNOSIS — E88.819 INSULIN RESISTANCE: ICD-10-CM

## 2023-10-02 DIAGNOSIS — M1A.0790 IDIOPATHIC CHRONIC GOUT OF FOOT WITHOUT TOPHUS, UNSPECIFIED LATERALITY: ICD-10-CM

## 2023-10-02 DIAGNOSIS — I10 ESSENTIAL HYPERTENSION: ICD-10-CM

## 2023-10-02 DIAGNOSIS — Z12.11 COLON CANCER SCREENING: ICD-10-CM

## 2023-10-02 DIAGNOSIS — R10.9 FLANK PAIN: ICD-10-CM

## 2023-10-02 DIAGNOSIS — E88.810 DYSMETABOLIC SYNDROME: ICD-10-CM

## 2023-10-02 DIAGNOSIS — R73.09 ELEVATED RANDOM BLOOD GLUCOSE LEVEL: ICD-10-CM

## 2023-10-02 DIAGNOSIS — F41.9 ANXIETY: ICD-10-CM

## 2023-10-02 DIAGNOSIS — E66.01 MORBID OBESITY WITH BMI OF 50.0-59.9, ADULT (HCC): ICD-10-CM

## 2023-10-02 DIAGNOSIS — Z00.00 ROUTINE GENERAL MEDICAL EXAMINATION AT A HEALTH CARE FACILITY: Primary | ICD-10-CM

## 2023-10-02 DIAGNOSIS — R53.83 OTHER FATIGUE: ICD-10-CM

## 2023-10-02 RX ORDER — AMLODIPINE BESYLATE 10 MG/1
10 TABLET ORAL DAILY
Qty: 90 TABLET | Refills: 3 | Status: SHIPPED | OUTPATIENT
Start: 2023-10-02

## 2023-10-17 ENCOUNTER — NURSE ONLY (OUTPATIENT)
Dept: FAMILY MEDICINE CLINIC | Age: 48
End: 2023-10-17

## 2023-10-17 VITALS — DIASTOLIC BLOOD PRESSURE: 88 MMHG | HEART RATE: 94 BPM | SYSTOLIC BLOOD PRESSURE: 134 MMHG

## 2023-10-17 DIAGNOSIS — I10 ESSENTIAL HYPERTENSION: Primary | ICD-10-CM

## 2023-10-17 PROCEDURE — 99999 PR OFFICE/OUTPT VISIT,PROCEDURE ONLY: CPT | Performed by: FAMILY MEDICINE

## 2023-10-17 NOTE — PROGRESS NOTES
Chief Complaint   Patient presents with    Blood Pressure Check       Vitals:    10/17/23 1533   BP: 134/88   Pulse: 94     No changes to med    Call patient

## 2023-12-11 ENCOUNTER — NURSE ONLY (OUTPATIENT)
Dept: LAB | Age: 48
End: 2023-12-11

## 2023-12-11 ENCOUNTER — TELEPHONE (OUTPATIENT)
Age: 48
End: 2023-12-11

## 2023-12-11 ENCOUNTER — TELEPHONE (OUTPATIENT)
Dept: FAMILY MEDICINE CLINIC | Age: 48
End: 2023-12-11

## 2023-12-11 ENCOUNTER — CLINICAL DOCUMENTATION (OUTPATIENT)
Age: 48
End: 2023-12-11

## 2023-12-11 DIAGNOSIS — E87.0 HYPERNATREMIA: Primary | ICD-10-CM

## 2023-12-11 DIAGNOSIS — E87.0 HYPERNATREMIA: ICD-10-CM

## 2023-12-11 NOTE — TELEPHONE ENCOUNTER
Patient was in here for bloodwork today and asked if Dr Dorita Thompson could give him a work note stating he was off Friday, Saturday and today. I had to leave work on Friday because he thought he ate something bad and has been sick. [facility] for note? Please advise and can call patient to  or if further questions at [phone].

## 2023-12-11 NOTE — TELEPHONE ENCOUNTER
[facility] called in regarding creatinine 24 hr urine. I advised this was . [facility] advised that they have already collected urine for this patient. Can you please reorder this lab?

## 2023-12-12 ENCOUNTER — TELEPHONE (OUTPATIENT)
Age: 48
End: 2023-12-12

## 2023-12-12 LAB
CREAT 24H UR-MRATE: 3.2 GM/24HR (ref 1–2.4)
CREAT UR-MCNC: 105.1 MG/DL
HOURS COLLECTED: 24 HRS
URINE VOLUME, 24 HOUR: 3010 ML

## 2023-12-15 ENCOUNTER — NURSE ONLY (OUTPATIENT)
Dept: LAB | Age: 48
End: 2023-12-15

## 2023-12-15 DIAGNOSIS — R10.9 FLANK PAIN: ICD-10-CM

## 2023-12-15 DIAGNOSIS — E88.819 INSULIN RESISTANCE: ICD-10-CM

## 2023-12-15 DIAGNOSIS — E55.9 VITAMIN D DEFICIENCY: ICD-10-CM

## 2023-12-15 DIAGNOSIS — M1A.0790 IDIOPATHIC CHRONIC GOUT OF FOOT WITHOUT TOPHUS, UNSPECIFIED LATERALITY: ICD-10-CM

## 2023-12-15 DIAGNOSIS — Z00.00 ROUTINE GENERAL MEDICAL EXAMINATION AT A HEALTH CARE FACILITY: ICD-10-CM

## 2023-12-15 DIAGNOSIS — R53.83 OTHER FATIGUE: ICD-10-CM

## 2023-12-15 DIAGNOSIS — E88.810 DYSMETABOLIC SYNDROME: ICD-10-CM

## 2023-12-15 LAB
ANION GAP SERPL CALC-SCNC: 10 MEQ/L (ref 8–16)
BUN SERPL-MCNC: 17 MG/DL (ref 7–22)
CALCIUM SERPL-MCNC: 9.2 MG/DL (ref 8.5–10.5)
CHLORIDE SERPL-SCNC: 105 MEQ/L (ref 98–111)
CHOLEST SERPL-MCNC: 112 MG/DL (ref 100–199)
CO2 SERPL-SCNC: 28 MEQ/L (ref 23–33)
CREAT SERPL-MCNC: 0.8 MG/DL (ref 0.4–1.2)
CREAT UR-MCNC: 158.2 MG/DL
DEPRECATED RDW RBC AUTO: 46.9 FL (ref 35–45)
ERYTHROCYTE [DISTWIDTH] IN BLOOD BY AUTOMATED COUNT: 13.2 % (ref 11.5–14.5)
GFR SERPL CREATININE-BSD FRML MDRD: > 60 ML/MIN/1.73M2
GLUCOSE SERPL-MCNC: 93 MG/DL (ref 70–108)
HCT VFR BLD AUTO: 42.6 % (ref 42–52)
HDLC SERPL-MCNC: 35 MG/DL
HGB BLD-MCNC: 13.9 GM/DL (ref 14–18)
LDLC SERPL CALC-MCNC: 56 MG/DL
MCH RBC QN AUTO: 31.6 PG (ref 26–33)
MCHC RBC AUTO-ENTMCNC: 32.6 GM/DL (ref 32.2–35.5)
MCV RBC AUTO: 96.8 FL (ref 80–94)
MICROALBUMIN UR-MCNC: < 1.2 MG/DL
MICROALBUMIN/CREAT RATIO PNL UR: 8 MG/G (ref 0–30)
PLATELET # BLD AUTO: 154 THOU/MM3 (ref 130–400)
PMV BLD AUTO: 10.4 FL (ref 9.4–12.4)
POTASSIUM SERPL-SCNC: 4.3 MEQ/L (ref 3.5–5.2)
RBC # BLD AUTO: 4.4 MILL/MM3 (ref 4.7–6.1)
SODIUM SERPL-SCNC: 143 MEQ/L (ref 135–145)
TRIGL SERPL-MCNC: 107 MG/DL (ref 0–199)
TSH SERPL DL<=0.005 MIU/L-ACNC: 1.59 UIU/ML (ref 0.4–4.2)
WBC # BLD AUTO: 4.8 THOU/MM3 (ref 4.8–10.8)

## 2023-12-16 LAB
25(OH)D3 SERPL-MCNC: 18 NG/ML (ref 30–100)
ALBUMIN SERPL BCG-MCNC: 4.4 G/DL (ref 3.5–5.1)
ALP SERPL-CCNC: 73 U/L (ref 38–126)
ALT SERPL W/O P-5'-P-CCNC: 35 U/L (ref 11–66)
AST SERPL-CCNC: 25 U/L (ref 5–40)
BILIRUB SERPL-MCNC: 0.6 MG/DL (ref 0.3–1.2)
DEPRECATED MEAN GLUCOSE BLD GHB EST-ACNC: 90 MG/DL (ref 70–126)
FOLATE SERPL-MCNC: 9.3 NG/ML (ref 4.8–24.2)
GLUCOSE FASTING: 93 MG/DL (ref 70–108)
HBA1C MFR BLD HPLC: 5 % (ref 4.4–6.4)
PROT SERPL-MCNC: 6.9 G/DL (ref 6.1–8)
URATE SERPL-MCNC: 6.7 MG/DL (ref 3.7–7)
VIT B12 SERPL-MCNC: 356 PG/ML (ref 211–911)

## 2023-12-18 ENCOUNTER — OFFICE VISIT (OUTPATIENT)
Age: 48
End: 2023-12-18
Payer: COMMERCIAL

## 2023-12-18 VITALS
DIASTOLIC BLOOD PRESSURE: 86 MMHG | WEIGHT: 315 LBS | SYSTOLIC BLOOD PRESSURE: 146 MMHG | HEIGHT: 75 IN | HEART RATE: 89 BPM | BODY MASS INDEX: 39.17 KG/M2

## 2023-12-18 DIAGNOSIS — E87.0 HYPERNATREMIA: Primary | ICD-10-CM

## 2023-12-18 DIAGNOSIS — R73.9 HYPERGLYCEMIA: ICD-10-CM

## 2023-12-18 PROCEDURE — G8417 CALC BMI ABV UP PARAM F/U: HCPCS | Performed by: INTERNAL MEDICINE

## 2023-12-18 PROCEDURE — 4004F PT TOBACCO SCREEN RCVD TLK: CPT | Performed by: INTERNAL MEDICINE

## 2023-12-18 PROCEDURE — 3077F SYST BP >= 140 MM HG: CPT | Performed by: INTERNAL MEDICINE

## 2023-12-18 PROCEDURE — G8484 FLU IMMUNIZE NO ADMIN: HCPCS | Performed by: INTERNAL MEDICINE

## 2023-12-18 PROCEDURE — 3079F DIAST BP 80-89 MM HG: CPT | Performed by: INTERNAL MEDICINE

## 2023-12-18 PROCEDURE — 99214 OFFICE O/P EST MOD 30 MIN: CPT | Performed by: INTERNAL MEDICINE

## 2023-12-18 PROCEDURE — G8427 DOCREV CUR MEDS BY ELIG CLIN: HCPCS | Performed by: INTERNAL MEDICINE

## 2023-12-18 NOTE — PROGRESS NOTES
Wild Montoya is a 48 y.o. , male who comes for f/u for hyperglycemia.    PCP: Sienna Escudero MD    HPI   This patient recently requested his PCP to check his A1c because his father was diabetic.  The A1c came back at 5.1.  As a result the patient was placed on metformin.  He does not believe he has diabetes mellitus, and he would like to have a thorough evaluation to clarify as to whether or not he is diabetic.  At the last visit he was found within normal A1c and fructosamine.  He denies polyuria, polydipsia and visual blurriness.  This patient has had borderline/high sodium level.  We obtained 24-hour urine for volume measurement which came back borderline at 3.01 L.  Overall condition has not changed except that he has lost 7 pounds since the last visit.  Past Medical History:   Diagnosis Date    Benign intracranial hypertension 7/16/2013    Dysmetabolic syndrome     Family history of premature ventricular contractions     GERD (gastroesophageal reflux disease)     Gout     Hemorrhoids     History of morbid obesity     Hyperlipemia     BHARATHI on CPAP     Pseudotumor cerebri     Magen Bo    Second opinion for hypertension     Tobacco abuse       Past Surgical History:   Procedure Laterality Date    CARDIOVASCULAR STRESS TEST  5 29 2007    Preserved LV function and estimated EF of 65%.     FINGER SURGERY      HAND TENDON SURGERY      TRANSTHORACIC ECHOCARDIOGRAM  1 11 2011    EF 55-65% with macrocytic LVH, stage I dysfunction, left atrium mildly dilated probably secondary to diastolic dysfunction. Demonstrated right ventricle markedly dilated.        Family History   Problem Relation Age of Onset    Heart Disease Father         dad had coronary disease when he was 18 yrs old.    Diabetes Father     Pacemaker Father     Cancer Mother         large b-cell lymphoma    Cancer Paternal Aunt         pancreatric     Cancer Maternal Grandmother         pancreatric      Social History     Tobacco Use

## 2024-01-31 RX ORDER — SODIUM CHLORIDE 9 MG/ML
INJECTION, SOLUTION INTRAVENOUS CONTINUOUS
Status: DISCONTINUED | OUTPATIENT
Start: 2024-01-31 | End: 2024-02-02 | Stop reason: HOSPADM

## 2024-01-31 RX ORDER — SODIUM CHLORIDE 9 MG/ML
25 INJECTION, SOLUTION INTRAVENOUS PRN
Status: DISCONTINUED | OUTPATIENT
Start: 2024-01-31 | End: 2024-02-02 | Stop reason: HOSPADM

## 2024-01-31 RX ORDER — SODIUM CHLORIDE 0.9 % (FLUSH) 0.9 %
5-40 SYRINGE (ML) INJECTION PRN
Status: DISCONTINUED | OUTPATIENT
Start: 2024-01-31 | End: 2024-02-02 | Stop reason: HOSPADM

## 2024-01-31 RX ORDER — SODIUM CHLORIDE 0.9 % (FLUSH) 0.9 %
5-40 SYRINGE (ML) INJECTION EVERY 12 HOURS SCHEDULED
Status: DISCONTINUED | OUTPATIENT
Start: 2024-01-31 | End: 2024-02-02 | Stop reason: HOSPADM

## 2024-02-02 ENCOUNTER — ANESTHESIA EVENT (OUTPATIENT)
Dept: ENDOSCOPY | Age: 49
End: 2024-02-02
Payer: COMMERCIAL

## 2024-02-02 ENCOUNTER — ANESTHESIA (OUTPATIENT)
Dept: ENDOSCOPY | Age: 49
End: 2024-02-02
Payer: COMMERCIAL

## 2024-02-02 ENCOUNTER — HOSPITAL ENCOUNTER (OUTPATIENT)
Age: 49
Setting detail: OUTPATIENT SURGERY
Discharge: HOME | End: 2024-02-02
Attending: INTERNAL MEDICINE | Admitting: INTERNAL MEDICINE
Payer: COMMERCIAL

## 2024-02-02 VITALS
OXYGEN SATURATION: 96 % | HEIGHT: 75 IN | DIASTOLIC BLOOD PRESSURE: 70 MMHG | BODY MASS INDEX: 39.17 KG/M2 | WEIGHT: 315 LBS | SYSTOLIC BLOOD PRESSURE: 120 MMHG | HEART RATE: 76 BPM | TEMPERATURE: 97.5 F | RESPIRATION RATE: 16 BRPM

## 2024-02-02 PROCEDURE — 3700000001 HC ADD 15 MINUTES (ANESTHESIA): Performed by: INTERNAL MEDICINE

## 2024-02-02 PROCEDURE — 6360000002 HC RX W HCPCS: Performed by: NURSE ANESTHETIST, CERTIFIED REGISTERED

## 2024-02-02 PROCEDURE — 3700000000 HC ANESTHESIA ATTENDED CARE: Performed by: INTERNAL MEDICINE

## 2024-02-02 PROCEDURE — 88305 TISSUE EXAM BY PATHOLOGIST: CPT

## 2024-02-02 PROCEDURE — 2580000003 HC RX 258: Performed by: INTERNAL MEDICINE

## 2024-02-02 PROCEDURE — 3609010600 HC COLONOSCOPY POLYPECTOMY SNARE/COLD BIOPSY: Performed by: INTERNAL MEDICINE

## 2024-02-02 PROCEDURE — 7100000010 HC PHASE II RECOVERY - FIRST 15 MIN: Performed by: INTERNAL MEDICINE

## 2024-02-02 PROCEDURE — 2709999900 HC NON-CHARGEABLE SUPPLY: Performed by: INTERNAL MEDICINE

## 2024-02-02 RX ORDER — PROPOFOL 10 MG/ML
INJECTION, EMULSION INTRAVENOUS PRN
Status: DISCONTINUED | OUTPATIENT
Start: 2024-02-02 | End: 2024-02-02 | Stop reason: SDUPTHER

## 2024-02-02 RX ADMIN — PROPOFOL 50 MG: 10 INJECTION, EMULSION INTRAVENOUS at 09:20

## 2024-02-02 RX ADMIN — PROPOFOL 50 MG: 10 INJECTION, EMULSION INTRAVENOUS at 09:08

## 2024-02-02 RX ADMIN — PROPOFOL 50 MG: 10 INJECTION, EMULSION INTRAVENOUS at 09:11

## 2024-02-02 RX ADMIN — SODIUM CHLORIDE: 9 INJECTION, SOLUTION INTRAVENOUS at 07:54

## 2024-02-02 RX ADMIN — PROPOFOL 50 MG: 10 INJECTION, EMULSION INTRAVENOUS at 09:37

## 2024-02-02 RX ADMIN — PROPOFOL 50 MG: 10 INJECTION, EMULSION INTRAVENOUS at 09:13

## 2024-02-02 RX ADMIN — PROPOFOL 50 MG: 10 INJECTION, EMULSION INTRAVENOUS at 09:10

## 2024-02-02 RX ADMIN — PROPOFOL 50 MG: 10 INJECTION, EMULSION INTRAVENOUS at 09:31

## 2024-02-02 RX ADMIN — PROPOFOL 50 MG: 10 INJECTION, EMULSION INTRAVENOUS at 09:36

## 2024-02-02 RX ADMIN — PROPOFOL 50 MG: 10 INJECTION, EMULSION INTRAVENOUS at 09:55

## 2024-02-02 RX ADMIN — PROPOFOL 50 MG: 10 INJECTION, EMULSION INTRAVENOUS at 09:21

## 2024-02-02 RX ADMIN — PROPOFOL 50 MG: 10 INJECTION, EMULSION INTRAVENOUS at 09:27

## 2024-02-02 RX ADMIN — PROPOFOL 50 MG: 10 INJECTION, EMULSION INTRAVENOUS at 09:09

## 2024-02-02 RX ADMIN — PROPOFOL 50 MG: 10 INJECTION, EMULSION INTRAVENOUS at 09:22

## 2024-02-02 RX ADMIN — PROPOFOL 50 MG: 10 INJECTION, EMULSION INTRAVENOUS at 09:25

## 2024-02-02 RX ADMIN — PROPOFOL 50 MG: 10 INJECTION, EMULSION INTRAVENOUS at 09:58

## 2024-02-02 RX ADMIN — PROPOFOL 50 MG: 10 INJECTION, EMULSION INTRAVENOUS at 10:01

## 2024-02-02 RX ADMIN — PROPOFOL 50 MG: 10 INJECTION, EMULSION INTRAVENOUS at 09:17

## 2024-02-02 RX ADMIN — PROPOFOL 50 MG: 10 INJECTION, EMULSION INTRAVENOUS at 10:05

## 2024-02-02 RX ADMIN — PROPOFOL 50 MG: 10 INJECTION, EMULSION INTRAVENOUS at 09:33

## 2024-02-02 RX ADMIN — PROPOFOL 50 MG: 10 INJECTION, EMULSION INTRAVENOUS at 10:03

## 2024-02-02 RX ADMIN — PROPOFOL 50 MG: 10 INJECTION, EMULSION INTRAVENOUS at 09:54

## 2024-02-02 RX ADMIN — PROPOFOL 50 MG: 10 INJECTION, EMULSION INTRAVENOUS at 09:49

## 2024-02-02 RX ADMIN — PROPOFOL 50 MG: 10 INJECTION, EMULSION INTRAVENOUS at 09:48

## 2024-02-02 RX ADMIN — PROPOFOL 50 MG: 10 INJECTION, EMULSION INTRAVENOUS at 09:46

## 2024-02-02 RX ADMIN — PROPOFOL 100 MG: 10 INJECTION, EMULSION INTRAVENOUS at 09:42

## 2024-02-02 RX ADMIN — PROPOFOL 50 MG: 10 INJECTION, EMULSION INTRAVENOUS at 09:16

## 2024-02-02 RX ADMIN — PROPOFOL 50 MG: 10 INJECTION, EMULSION INTRAVENOUS at 09:15

## 2024-02-02 ASSESSMENT — LIFESTYLE VARIABLES: SMOKING_STATUS: 1

## 2024-02-02 ASSESSMENT — PAIN - FUNCTIONAL ASSESSMENT
PAIN_FUNCTIONAL_ASSESSMENT: NONE - DENIES PAIN
PAIN_FUNCTIONAL_ASSESSMENT: 0-10
PAIN_FUNCTIONAL_ASSESSMENT: 0-10

## 2024-02-02 ASSESSMENT — ENCOUNTER SYMPTOMS: SHORTNESS OF BREATH: 1

## 2024-02-02 NOTE — PROGRESS NOTES
PT ADMITTED TO [facility] FOR COLONOSCOPY. PT SIGNED CONSENT. CALL LIGHT WITHIN REACH. WIFE CHRISTIANO IN WAITING ROOM TO SPEAK WITH AFTER PROCEDURE.

## 2024-02-02 NOTE — OP NOTE
[facility]  Colonoscopy Procedure Note      Patient: Wild Montoya  : 1975      Procedure: Colonoscopy with polypectomy (cold snare), polypectomy (cold biopsy)    Date:  2024     Endoscopist:   Deb Mueller MD    Referring Physician: Deb Mueller MD  Primary Care Physician: Sienna Escudero MD    Indications: This is a 48 y.o. year old male who presents for screening colonoscopy.    Anesthesia: MAC per Anesthesia. Please see anesthesia report.    Consent:  The patient or their legal guardian has signed a consent and is aware of the potential risks, benefits, alternatives, and potential complications of this procedure.  These include, but are not limited to hemorrhage, bleeding, post procedural pain, perforation, phlebitis, aspiration, hypotension, hypoxia, cardiovascular events such as arrhythmia, and possibly death.  Additionally, the possibility of missed colonic polyps and interval colon cancer was discussed in the consent.    Description of Procedure: The patient was then taken to the endoscopy suite and placed in the left lateral decubitus position and the above IV sedation was administered.    The perianal area was inspected and a digital rectal examination was performed.    A forward-viewing Olympus video adult colonoscope was lubricated and inserted through the patient's anus into the rectum. Under direct visualization, the scope was advanced to the ileocecal valve. [place] pictures were obtained.      The prep was good.     The scope was then slowly withdrawn and circumferential examination of the mucosa was performed.  The scope was then withdrawn into the rectum and retroflexed.  A retroflexed view of the anal verge and rectum was obtained. The scope was straightened, the colon was decompressed and the scope was withdrawn from the patient.  The patient tolerated the procedure well and was taken to the recovery area in good condition. There were no immediate

## 2024-02-02 NOTE — ANESTHESIA POSTPROCEDURE EVALUATION
Department of Anesthesiology  Postprocedure Note    Patient: Wild Montoya  MRN: [id number]  YOB: 1975  Date of evaluation: 2/2/2024    Procedure Summary       Date: 02/02/24 Room / Location: [place] [address] / [facility]    Anesthesia Start: 0900 Anesthesia Stop: 1016    Procedure: COLONOSCOPY POLYPECTOMY SNARE/COLD BIOPSY Diagnosis:       Screening for colon cancer      (Screening for colon cancer [Z12.11])    Surgeons: Deb Mueller MD Responsible Provider: Mariano Mueller MD    Anesthesia Type: MAC ASA Status: 3            Anesthesia Type: No value filed.    Charly Phase I: Charly Score: 10    Charly Phase II:      Anesthesia Post Evaluation    Patient location during evaluation: bedside  Patient participation: complete - patient participated  Level of consciousness: awake and alert  Airway patency: patent  Nausea & Vomiting: no nausea and no vomiting  Cardiovascular status: hemodynamically stable  Respiratory status: acceptable, spontaneous ventilation and room air  Hydration status: euvolemic  Pain management: adequate        No notable events documented.

## 2024-02-02 NOTE — PROGRESS NOTES
Colonoscopy completed. Pt tolerated well. 5 polyps removed, 3 jars labeled and sent to lab for processing. Photos taken.     Scope #  used.

## 2024-02-02 NOTE — ANESTHESIA PRE PROCEDURE
12/15/2023 11:10 AM    ALKPHOS 73 12/15/2023 11:10 AM    AST 25 12/15/2023 11:10 AM    ALT 35 12/15/2023 11:10 AM       POC Tests: No results for input(s): \"POCGLU\", \"POCNA\", \"POCK\", \"POCCL\", \"POCBUN\", \"POCHEMO\", \"POCHCT\" in the last 72 hours.    Coags: No results found for: \"PROTIME\", \"INR\", \"APTT\"    HCG (If Applicable): No results found for: \"PREGTESTUR\", \"PREGSERUM\", \"HCG\", \"HCGQUANT\"     ABGs: No results found for: \"PHART\", \"PO2ART\", \"VTC1VIW\", \"JXP3AOQ\", \"BEART\", \"Q3HZNECO\"     Type & Screen (If Applicable):  No results found for: \"LABABO\", \"LABRH\"    Drug/Infectious Status (If Applicable):  No results found for: \"HIV\", \"HEPCAB\"    COVID-19 Screening (If Applicable):   Lab Results   Component Value Date/Time    COVID19 DETECTED 11/08/2021 02:57 PM           Anesthesia Evaluation  Patient summary reviewed and Nursing notes reviewed   no history of anesthetic complications:   Airway: Mallampati: IV  TM distance: >3 FB   Neck ROM: full  Mouth opening: > = 3 FB   Dental:          Pulmonary:   (+)   shortness of breath: chronic,   sleep apnea: on CPAP,       current smoker          Patient did not smoke on day of surgery.                 Cardiovascular:  Exercise tolerance: good (>4 METS)  (+) hypertension:, hyperlipidemia                  Neuro/Psych:                ROS comment: Pseudotumor cerebri GI/Hepatic/Renal:   (+) GERD:, bowel prep, morbid obesity          Endo/Other: Negative Endo/Other ROS                    Abdominal:             Vascular: negative vascular ROS.         Other Findings:             Anesthesia Plan      MAC     ASA 3       Induction: intravenous.      Anesthetic plan and risks discussed with patient and spouse.      Plan discussed with attending.                    Socorro Soto, APRN - CRNA   2/2/2024

## 2024-02-02 NOTE — DISCHARGE INSTRUCTIONS
- Await pathology.  - Continue current medications.  - Repeat colonoscopy in 3 years depending upon pathology results  - Follow up with primary care physician as scheduled.  - Follow up with myself in the interim as needed.

## 2024-02-02 NOTE — H&P
[facility]   Pre-Operative History and Physical: Colonoscopy    Patient: Wild Montoya  : 1975      History Obtained From:  patient, electronic medical record    HISTORY OF PRESENT ILLNESS:    The patient is a 48 y.o. male who present for screening colonoscopy.    Past Medical History:        Diagnosis Date    Benign intracranial hypertension 2013    Dysmetabolic syndrome     Family history of premature ventricular contractions     GERD (gastroesophageal reflux disease)     Gout     Hemorrhoids     History of morbid obesity     Hyperlipemia     BHARATHI on CPAP     Pseudotumor cerebri     Magen Bo    Second opinion for hypertension     Tobacco abuse      Past Surgical History:        Procedure Laterality Date    CARDIOVASCULAR STRESS TEST  2007    Preserved LV function and estimated EF of 65%.     FINGER SURGERY      HAND TENDON SURGERY      TRANSTHORACIC ECHOCARDIOGRAM  2011    EF 55-65% with macrocytic LVH, stage I dysfunction, left atrium mildly dilated probably secondary to diastolic dysfunction. Demonstrated right ventricle markedly dilated.      Medications Prior to Admission:   No current facility-administered medications on file prior to encounter.     Current Outpatient Medications on File Prior to Encounter   Medication Sig Dispense Refill    amLODIPine (NORVASC) 10 MG tablet Take 1 tablet by mouth daily 90 tablet 3    losartan (COZAAR) 50 MG tablet TAKE ONE TABLET BY MOUTH TWICE A  tablet 3    blood glucose test strips (ONETOUCH ULTRA) strip 1 each by In Vitro route daily Check blood sugars daily 100 each 1    Blood Glucose Monitoring Suppl (ONE TOUCH ULTRA 2) w/Device KIT Check blood sugars daily 1 kit 0    vitamin D3 (CHOLECALCIFEROL) 25 MCG (1000 UT) TABS tablet Take 2 tablets by mouth daily 189 tablet 1    atorvastatin (LIPITOR) 20 MG tablet TAKE ONE TABLET BY MOUTH EVERY DAY 30 tablet 11    allopurinol (ZYLOPRIM) 100 MG tablet Once daily 90 tablet 11     hydroCHLOROthiazide (HYDRODIURIL) 25 MG tablet Once daily 30 tablet 11    Elastic Bandages & Supports (JOBST KNEE HIGH COMPRESSION SM) MISC 2 each by Does not apply route daily as needed (pedal edema) 1 each 2    Ascorbic Acid (MAURILIO-C PO) Take 1,000 mg by mouth daily      ibuprofen (ADVIL;MOTRIN) 600 MG tablet Take 1 tablet by mouth every 6 hours as needed for Pain 30 tablet 0    Esomeprazole Magnesium (NEXIUM PO) Take by mouth daily      ZINC PO Take 50 mg by mouth           Allergies:  Patient has no known allergies.    Social History:   TOBACCO:   reports that he has been smoking cigarettes. He started smoking about 30 years ago. He has a 30.1 pack-year smoking history. He has never used smokeless tobacco.  ETOH:   reports current alcohol use of about 12.0 standard drinks of alcohol per week.  DRUGS:   reports no history of drug use.  Family History:       Problem Relation Age of Onset    Heart Disease Father         dad had coronary disease when he was 18 yrs old.    Diabetes Father     Pacemaker Father     Cancer Mother         large b-cell lymphoma    Cancer Paternal Aunt         pancreatric     Cancer Maternal Grandmother         pancreatric        PHYSICAL EXAM:    BP (!) 168/93   Pulse 90   Temp 97.5 °F (36.4 °C) (Temporal)   Resp 18   Ht 1.905 m (6' 3\")   Wt (!) 209.6 kg (462 lb)   SpO2 97%   BMI 57.75 kg/m²       Heart:  Regular rate and rhythm  Lungs:  No increased work of breathing  Abdomen:  BS+, soft, non-distended, non-tender, no masses palpated    ASA Grade:  See Anesthesia documentation    Mallampati Classification:  See Anesthesia documentation    ASSESSMENT AND PLAN:    1.  Patient is a 48 y.o. male here for a colonoscopy with MAC.    2.  Procedure options, risks and benefits reviewed with patient.  We specifically discussed that risks include but are not limited to infection, bleeding, perforation, death, and missed lesions.  Patient expresses understanding.    Electronically signed by

## 2024-02-02 NOTE — PROGRESS NOTES
Patient is in phase 2 passing gas, taking fluids.  discussed findings, plan of care, discharge instructions with pt and .

## 2024-02-06 DIAGNOSIS — I10 ESSENTIAL HYPERTENSION: ICD-10-CM

## 2024-02-06 RX ORDER — HYDROCHLOROTHIAZIDE 25 MG/1
TABLET ORAL
Qty: 90 TABLET | Refills: 1 | Status: SHIPPED | OUTPATIENT
Start: 2024-02-06

## 2024-03-13 ENCOUNTER — NURSE ONLY (OUTPATIENT)
Dept: LAB | Age: 49
End: 2024-03-13

## 2024-03-13 DIAGNOSIS — E55.9 VITAMIN D DEFICIENCY: ICD-10-CM

## 2024-03-13 DIAGNOSIS — E87.0 HYPERNATREMIA: ICD-10-CM

## 2024-03-13 DIAGNOSIS — E88.819 INSULIN RESISTANCE: ICD-10-CM

## 2024-03-13 DIAGNOSIS — R73.9 HYPERGLYCEMIA: ICD-10-CM

## 2024-03-13 DIAGNOSIS — E66.01 MORBID OBESITY WITH BMI OF 50.0-59.9, ADULT (HCC): ICD-10-CM

## 2024-03-13 LAB
25(OH)D3 SERPL-MCNC: 18 NG/ML (ref 30–100)
ANION GAP SERPL CALC-SCNC: 10 MEQ/L (ref 8–16)
BUN SERPL-MCNC: 15 MG/DL (ref 7–22)
CALCIUM SERPL-MCNC: 9.3 MG/DL (ref 8.5–10.5)
CHLORIDE SERPL-SCNC: 108 MEQ/L (ref 98–111)
CO2 SERPL-SCNC: 28 MEQ/L (ref 23–33)
CREAT SERPL-MCNC: 0.8 MG/DL (ref 0.4–1.2)
DEPRECATED MEAN GLUCOSE BLD GHB EST-ACNC: 90 MG/DL (ref 70–126)
GFR SERPL CREATININE-BSD FRML MDRD: > 60 ML/MIN/1.73M2
GLUCOSE SERPL-MCNC: 103 MG/DL (ref 70–108)
HBA1C MFR BLD HPLC: 5 % (ref 4.4–6.4)
OSMOLALITY SERPL: 302 MOSMOL/KG (ref 275–295)
POTASSIUM SERPL-SCNC: 3.8 MEQ/L (ref 3.5–5.2)
SODIUM SERPL-SCNC: 146 MEQ/L (ref 135–145)

## 2024-03-14 LAB — INSULIN SERPL-ACNC: 34 MU/L

## 2024-03-17 ENCOUNTER — CLINICAL DOCUMENTATION (OUTPATIENT)
Age: 49
End: 2024-03-17

## 2024-03-27 ENCOUNTER — TELEPHONE (OUTPATIENT)
Dept: FAMILY MEDICINE CLINIC | Age: 49
End: 2024-03-27

## 2024-03-27 NOTE — TELEPHONE ENCOUNTER
----- Message from Betty Hyde sent at 3/27/2024  3:00 PM EDT -----  Subject: Message to Provider    QUESTIONS  Information for Provider? Patient had a death in the family so he had to   cancel his appt for 3/27. He'd like to know if he can come in tomorrow,   3/28 to do his 6 month f/u with Sienna? Or any sooner than 5/22 that he was   rescheduled.  ---------------------------------------------------------------------------  --------------  CALL BACK INFO  [phone]; OK to leave message on voicemail  ---------------------------------------------------------------------------  --------------  SCRIPT ANSWERS  Relationship to Patient? Self

## 2024-04-09 DIAGNOSIS — E78.00 PURE HYPERCHOLESTEROLEMIA: ICD-10-CM

## 2024-04-09 DIAGNOSIS — M1A.0790 IDIOPATHIC CHRONIC GOUT OF FOOT WITHOUT TOPHUS, UNSPECIFIED LATERALITY: ICD-10-CM

## 2024-04-09 RX ORDER — ATORVASTATIN CALCIUM 20 MG/1
TABLET, FILM COATED ORAL
Qty: 30 TABLET | Refills: 11 | Status: SHIPPED | OUTPATIENT
Start: 2024-04-09

## 2024-04-09 RX ORDER — ALLOPURINOL 100 MG/1
TABLET ORAL
Qty: 90 TABLET | Refills: 11 | Status: SHIPPED | OUTPATIENT
Start: 2024-04-09

## 2024-04-09 NOTE — TELEPHONE ENCOUNTER
Last visit- 10/2/2023  Next visit- 5/22/2024    Requested Prescriptions     Pending Prescriptions Disp Refills    atorvastatin (LIPITOR) 20 MG tablet [Pharmacy Med Name: ATORVASTATIN 20 MG TABLET] 30 tablet 11     Sig: TAKE ONE TABLET BY MOUTH DAILY    allopurinol (ZYLOPRIM) 100 MG tablet [Pharmacy Med Name: ALLOPURINOL 100 MG TABLET] 90 tablet 11     Sig: TAKE ONE TABLET BY MOUTH EVERY DAY

## 2024-04-29 ENCOUNTER — OFFICE VISIT (OUTPATIENT)
Dept: PULMONOLOGY | Age: 49
End: 2024-04-29
Payer: COMMERCIAL

## 2024-04-29 VITALS
SYSTOLIC BLOOD PRESSURE: 142 MMHG | TEMPERATURE: 98 F | BODY MASS INDEX: 39.17 KG/M2 | DIASTOLIC BLOOD PRESSURE: 80 MMHG | WEIGHT: 315 LBS | OXYGEN SATURATION: 96 % | HEART RATE: 91 BPM | HEIGHT: 75 IN

## 2024-04-29 DIAGNOSIS — E66.01 MORBID OBESITY WITH BMI OF 50.0-59.9, ADULT (HCC): ICD-10-CM

## 2024-04-29 DIAGNOSIS — G47.33 OBSTRUCTIVE SLEEP APNEA: Primary | ICD-10-CM

## 2024-04-29 PROCEDURE — 4004F PT TOBACCO SCREEN RCVD TLK: CPT | Performed by: PHYSICIAN ASSISTANT

## 2024-04-29 PROCEDURE — 99213 OFFICE O/P EST LOW 20 MIN: CPT | Performed by: PHYSICIAN ASSISTANT

## 2024-04-29 PROCEDURE — G8417 CALC BMI ABV UP PARAM F/U: HCPCS | Performed by: PHYSICIAN ASSISTANT

## 2024-04-29 PROCEDURE — 3077F SYST BP >= 140 MM HG: CPT | Performed by: PHYSICIAN ASSISTANT

## 2024-04-29 PROCEDURE — 3079F DIAST BP 80-89 MM HG: CPT | Performed by: PHYSICIAN ASSISTANT

## 2024-04-29 PROCEDURE — G8427 DOCREV CUR MEDS BY ELIG CLIN: HCPCS | Performed by: PHYSICIAN ASSISTANT

## 2024-04-29 ASSESSMENT — ENCOUNTER SYMPTOMS
DIARRHEA: 0
EYES NEGATIVE: 1
SHORTNESS OF BREATH: 0
WHEEZING: 0
CHEST TIGHTNESS: 0
ALLERGIC/IMMUNOLOGIC NEGATIVE: 1
STRIDOR: 0
NAUSEA: 0
BACK PAIN: 0
COUGH: 0

## 2024-04-29 NOTE — PROGRESS NOTES
and discussed with patient  - He  was advised to continue current positive airway pressure therapy with above described pressure.   - He  advised to keep good compliance with current recommended pressure to get optimal results and clinical improvement  - Recommend 7-9 hours of sleep with PAP  - He was advised to call [employer] regarding supplies if needed.   -He call my office for earlier appointment if needed for worsening of sleep symptoms.   - He was instructed on weight loss  - Wild was educated about my impression and plan. Patient verbalizesunderstanding.  We will see Wild Montoya back in: 1 year with download    Information added by my medical assistant/LPN was reviewed today       Kourtney Houston PA-C, [facility]  [facility]  4/29/2024     (Please note that portions of this note may have been with a voice recognition program.  Efforts were made to edit the dictation but occasionally words are mis-transcribed )

## 2024-05-21 SDOH — ECONOMIC STABILITY: FOOD INSECURITY: WITHIN THE PAST 12 MONTHS, THE FOOD YOU BOUGHT JUST DIDN'T LAST AND YOU DIDN'T HAVE MONEY TO GET MORE.: SOMETIMES TRUE

## 2024-05-21 SDOH — ECONOMIC STABILITY: INCOME INSECURITY: HOW HARD IS IT FOR YOU TO PAY FOR THE VERY BASICS LIKE FOOD, HOUSING, MEDICAL CARE, AND HEATING?: HARD

## 2024-05-21 SDOH — ECONOMIC STABILITY: HOUSING INSECURITY
IN THE LAST 12 MONTHS, WAS THERE A TIME WHEN YOU DID NOT HAVE A STEADY PLACE TO SLEEP OR SLEPT IN A SHELTER (INCLUDING NOW)?: PATIENT DECLINED

## 2024-05-21 SDOH — ECONOMIC STABILITY: FOOD INSECURITY: WITHIN THE PAST 12 MONTHS, YOU WORRIED THAT YOUR FOOD WOULD RUN OUT BEFORE YOU GOT MONEY TO BUY MORE.: SOMETIMES TRUE

## 2024-05-21 ASSESSMENT — PATIENT HEALTH QUESTIONNAIRE - PHQ9
SUM OF ALL RESPONSES TO PHQ9 QUESTIONS 1 & 2: 0
SUM OF ALL RESPONSES TO PHQ9 QUESTIONS 1 & 2: 0
1. LITTLE INTEREST OR PLEASURE IN DOING THINGS: NOT AT ALL
1. LITTLE INTEREST OR PLEASURE IN DOING THINGS: NOT AT ALL
2. FEELING DOWN, DEPRESSED OR HOPELESS: NOT AT ALL
SUM OF ALL RESPONSES TO PHQ QUESTIONS 1-9: 0
2. FEELING DOWN, DEPRESSED OR HOPELESS: NOT AT ALL
SUM OF ALL RESPONSES TO PHQ QUESTIONS 1-9: 0

## 2024-05-22 ENCOUNTER — OFFICE VISIT (OUTPATIENT)
Dept: FAMILY MEDICINE CLINIC | Age: 49
End: 2024-05-22

## 2024-05-22 VITALS
HEIGHT: 75 IN | DIASTOLIC BLOOD PRESSURE: 86 MMHG | HEART RATE: 86 BPM | RESPIRATION RATE: 18 BRPM | WEIGHT: 315 LBS | BODY MASS INDEX: 39.17 KG/M2 | SYSTOLIC BLOOD PRESSURE: 136 MMHG | TEMPERATURE: 97.6 F | OXYGEN SATURATION: 97 %

## 2024-05-22 DIAGNOSIS — N52.8 OTHER MALE ERECTILE DYSFUNCTION: ICD-10-CM

## 2024-05-22 DIAGNOSIS — K21.9 GASTROESOPHAGEAL REFLUX DISEASE WITHOUT ESOPHAGITIS: ICD-10-CM

## 2024-05-22 DIAGNOSIS — E88.810 DYSMETABOLIC SYNDROME: ICD-10-CM

## 2024-05-22 DIAGNOSIS — E55.9 VITAMIN D DEFICIENCY: ICD-10-CM

## 2024-05-22 DIAGNOSIS — G93.2 BENIGN INTRACRANIAL HYPERTENSION: ICD-10-CM

## 2024-05-22 DIAGNOSIS — L84 CALLUS OF FOOT: ICD-10-CM

## 2024-05-22 DIAGNOSIS — G93.2 PSEUDOTUMOR CEREBRI: ICD-10-CM

## 2024-05-22 DIAGNOSIS — E88.819 INSULIN RESISTANCE: ICD-10-CM

## 2024-05-22 DIAGNOSIS — R53.83 OTHER FATIGUE: ICD-10-CM

## 2024-05-22 DIAGNOSIS — Z00.00 ROUTINE GENERAL MEDICAL EXAMINATION AT A HEALTH CARE FACILITY: ICD-10-CM

## 2024-05-22 DIAGNOSIS — M1A.0790 IDIOPATHIC CHRONIC GOUT OF FOOT WITHOUT TOPHUS, UNSPECIFIED LATERALITY: ICD-10-CM

## 2024-05-22 DIAGNOSIS — F41.9 ANXIETY: ICD-10-CM

## 2024-05-22 DIAGNOSIS — Z82.49: ICD-10-CM

## 2024-05-22 DIAGNOSIS — R73.09 ELEVATED RANDOM BLOOD GLUCOSE LEVEL: ICD-10-CM

## 2024-05-22 DIAGNOSIS — G47.33 OBSTRUCTIVE SLEEP APNEA ON CPAP: ICD-10-CM

## 2024-05-22 DIAGNOSIS — R60.0 PEDAL EDEMA: ICD-10-CM

## 2024-05-22 DIAGNOSIS — E78.00 PURE HYPERCHOLESTEROLEMIA: ICD-10-CM

## 2024-05-22 DIAGNOSIS — Z72.0 TOBACCO ABUSE: ICD-10-CM

## 2024-05-22 DIAGNOSIS — E66.01 MORBID OBESITY WITH BMI OF 50.0-59.9, ADULT (HCC): ICD-10-CM

## 2024-05-22 DIAGNOSIS — I10 ESSENTIAL HYPERTENSION: Primary | ICD-10-CM

## 2024-05-22 RX ORDER — HYDROCHLOROTHIAZIDE 25 MG/1
TABLET ORAL
Qty: 90 TABLET | Refills: 1 | Status: SHIPPED | OUTPATIENT
Start: 2024-05-22

## 2024-05-22 NOTE — PROGRESS NOTES
1.   Are you sick today?  No  2.   Do you have allergies to medication,food, or any vaccine?  No          Allergies:  Patient has no known allergies.    3.   Have you ever had a serious reaction after receiving a vaccination?  No  4.   Do you have a long-term health problem with heart disease, lung disease, asthma, kidney disease,        metabolic disease (e.g., diabetes, anemia, or other blood disorder?  No  5.   Do you have cancer, leukemia, AIDS, or any other immune system problem?  No  6.   Have you had a seizure, brain, or other nervous system problem?  No          Medical History:    Past Medical History:   Diagnosis Date    Benign intracranial hypertension 07/16/2013    Dysmetabolic syndrome     Family history of premature ventricular contractions     GERD (gastroesophageal reflux disease)     Gout     Hemorrhoids     History of morbid obesity     Hyperlipemia     Obesity     BHARATHI on CPAP     Pseudotumor cerebri     Magen Bo    Second opinion for hypertension     Tobacco abuse        7.   Do you take cortisone, prednisone, other steroids, or anticancer drugs, or have you had radiation        Treatments?  No          Current Medications:    Current Outpatient Medications   Medication Sig Dispense Refill    hydroCHLOROthiazide (HYDRODIURIL) 25 MG tablet TAKE ONE TABLET BY MOUTH EVERY DAY 90 tablet 1    atorvastatin (LIPITOR) 20 MG tablet TAKE ONE TABLET BY MOUTH DAILY 30 tablet 11    allopurinol (ZYLOPRIM) 100 MG tablet TAKE ONE TABLET BY MOUTH EVERY DAY 90 tablet 11    amLODIPine (NORVASC) 10 MG tablet Take 1 tablet by mouth daily 90 tablet 3    losartan (COZAAR) 50 MG tablet TAKE ONE TABLET BY MOUTH TWICE A  tablet 3    vitamin D3 (CHOLECALCIFEROL) 25 MCG (1000 UT) TABS tablet Take 2 tablets by mouth daily 189 tablet 1    Elastic Bandages & Supports (JOBST KNEE HIGH COMPRESSION SM) MISC 2 each by Does not apply route daily as needed (pedal edema) 1 each 2    Ascorbic Acid (MAURILIO-C PO) Take

## 2024-05-22 NOTE — PROGRESS NOTES
[facility] PROFESSIONAL SERVS  [facility] MEDICINE  [address]  [address]  [facility] [address]  Dept: [phone]  Dept Fax: [phone]  Loc: [phone]    Wild Montoya is a 48 y.o. male who presents today for:  Chief Complaint   Patient presents with    6 Month Follow-Up    Other     Lump on left foot        HPI:     HPI  Yearly checkup.    Hyperlipidemia: Patient presents with hyperlipidemia.  He was tested because hypertension.  His last labs showed   Lab Results   Component Value Date    CHOL 112 12/15/2023    CHOL 114 03/31/2023    CHOL 119 03/14/2022     Lab Results   Component Value Date    TRIG 107 12/15/2023    TRIG 93 03/31/2023    TRIG 120 03/14/2022     Lab Results   Component Value Date    HDL 35 12/15/2023    HDL 36 03/31/2023    HDL 39 03/14/2022     No components found for: \"LDLCHOLESTEROL\", \"LDLCALC\"    No results found for: \"VLDL\"  No results found for: \"CHOLHDLRATIO\"  There is a family history of hyperlipidemia. There is a family history of early ischemia heart disease.    Hypertension: Patient here for follow-up of elevated blood pressure. He is exercising and is not adherent to low salt diet.  Blood pressure is well controlled at home. Cardiac symptoms none. Patient denies chest pain, dyspnea and palpitations.  Cardiovascular risk factors: none. Use of agents associated with hypertension: none. History of target organ damage: none.    GERD well controlled on the PPI.    BHARATHI on CPAP.    Gout controlled on allopurinol.     Reports that he has been smoking Cigarettes again.  He has a 20.00 pack-year smoking history. He has never used smokeless tobacco.  Not ready to quit.     Calling for ED since 5/2020.   He is having trouble with his marriage that they are gong to counseling for. He is able to obtain an erection but soft and does not maintain to ejaculation.  Morning erections still happening.  Something similar happened a few years ago.  Uses cialis prn which

## 2024-05-22 NOTE — PROGRESS NOTES
Immunizations Administered       Name Date Dose Route    TDaP, ADACEL (age 10y-64y), BOOSTRIX (age 10y+), IM, 0.5mL 5/22/2024 0.5 mL Intramuscular    Site: Deltoid- Right    Lot: [id number]    NDC: 97990-027-29            VIS GIVEN.  CONSENT SIGNED  PATIENT TOLERATED WELL.     ABN SIGNED.

## 2024-05-22 NOTE — PATIENT INSTRUCTIONS
[employer] Food Resources*  (Call 211 if need more resources.)    Home Delivered meals:    [employer] Delivered Meals:  Phone: 1-830.469.7456    [facility] ([place] only):  Phone: [phone]    Moms Meals:  What they offer: Nutritious refrigerated meals. Available to individuals with Medicare advantage plans, Medicaid plans, Long-Term services and supports (LTSS) programs and self-pay at $6.99 a meal.  Renal-Friendly, pureed and Gluten free at $7.99 a meal plus flat rate shipping.   Phone: 1-792.569.3035  Website: https://www.SpectrumDNA    Senior Nutrition program:  Phone: 1-856.990.1084    [employer]ply [employer] Home Delivered Meals (From [place]):  What they offer: Meals for seniors, disabled, recovering from an injury or illness on [place] and [facility]. Both privately and through homecare services like [facility] [place] and [facility].   Phone: 1-109.324.9885  Website: https://[employer].[place] [employer]    Other Food Resources:  MOW (Meals on Wheels) Contact Area Agency on Aging   Phone: [phone]    [facility]- Nutrition Program   What they offer: Nutrition education and nutritious foods for Pregnant women, women who just had a baby, breastfeeding moms, infants and children up to the age of 5.   Phone: 485.918.5748   Website: https://www.Apptive.org    [facility]- Provides Meals  What they offer: Provide food boxes to those in need monthly.  Phone: 247.791.7153  Website: https://[facility].org/ministries/[facility]    [employer]-Food/ Clothing   What they offer:  Provides a 3-day supply of food for individuals once a month.   Phone: 606.625.1964  Website: https://[employer].org/author/doriup1    [employer]-Meal Site   Phone: [phone]    [employer]- Meal Site  Phone: 1-864.158.4026    [employer] Daily [employer]:  What they offer: Provides continental breakfast and hot lunch   Phone: [phone]  Website:

## 2024-07-02 ENCOUNTER — TELEMEDICINE (OUTPATIENT)
Age: 49
End: 2024-07-02
Payer: COMMERCIAL

## 2024-07-02 DIAGNOSIS — E87.0 HYPERNATREMIA: Primary | ICD-10-CM

## 2024-07-02 DIAGNOSIS — R73.9 HYPERGLYCEMIA: ICD-10-CM

## 2024-07-02 PROCEDURE — 4004F PT TOBACCO SCREEN RCVD TLK: CPT | Performed by: INTERNAL MEDICINE

## 2024-07-02 PROCEDURE — G8427 DOCREV CUR MEDS BY ELIG CLIN: HCPCS | Performed by: INTERNAL MEDICINE

## 2024-07-02 PROCEDURE — G8417 CALC BMI ABV UP PARAM F/U: HCPCS | Performed by: INTERNAL MEDICINE

## 2024-07-02 PROCEDURE — 99214 OFFICE O/P EST MOD 30 MIN: CPT | Performed by: INTERNAL MEDICINE

## 2024-07-02 NOTE — PROGRESS NOTES
Wild Montoya, was evaluated through a synchronous (real-time) audio-video encounter. The patient (or guardian if applicable) is aware that this is a billable service, which includes applicable co-pays. This Virtual Visit was conducted with patient's (and/or legal guardian's) consent. Patient identification was verified, and a caregiver was present when appropriate.   The patient was located at Home: [address]  [address]  [facility] [facility] [address]  Provider was located at Facility (Appt Dept): [address] [place] [address] [address]  [place] [address]  Confirm you are appropriately licensed, registered, or certified to deliver care in the state where the patient is located as indicated above. If you are not or unsure, please re-schedule the visit: Yes, I confirm.     Wild Montoya (:  1975) is a Established patient, presenting virtually for evaluation of the following:    Assessment & Plan   Below is the assessment and plan developed based on review of pertinent history, physical exam, labs, studies, and medications.  1. Hypernatremia: Etiology not known.  To be repeated and then if abnormal we will proceed with additional studies.  2. Hyperglycemia: A1c was normal at the last visit.  This will be repeated.  He does not have symptoms of diabetes at this point.  -     Hemoglobin A1C; Future  -     Basic Metabolic Panel; Future    Return in about 3 months (around 10/2/2024).       Subjective   HPI  48-year-old male who originally came to me for evaluation of diabetes.  In the process of his evaluation we had noticed intermittent hyponatremia.  For example on 3/13/2024 his sodium was 146.  The patient was taking metformin previously but has discontinued that.  Most recent A1c was 5.0 in March.  Patient is not having any polyuria or polydipsia.  The patient has not noticed any changes of his vision.  Shortness of breath..  He denies abdominal pain, nausea or vomiting.  Review of Systems  Constitutional: No fevers

## 2024-07-12 ENCOUNTER — LAB (OUTPATIENT)
Dept: LAB | Age: 49
End: 2024-07-12

## 2024-07-12 DIAGNOSIS — R73.9 HYPERGLYCEMIA: ICD-10-CM

## 2024-07-12 LAB
ANION GAP SERPL CALC-SCNC: 13 MEQ/L (ref 8–16)
BUN SERPL-MCNC: 11 MG/DL (ref 7–22)
CALCIUM SERPL-MCNC: 8.9 MG/DL (ref 8.5–10.5)
CHLORIDE SERPL-SCNC: 106 MEQ/L (ref 98–111)
CO2 SERPL-SCNC: 24 MEQ/L (ref 23–33)
CREAT SERPL-MCNC: 0.7 MG/DL (ref 0.4–1.2)
DEPRECATED MEAN GLUCOSE BLD GHB EST-ACNC: 87 MG/DL (ref 70–126)
GFR SERPL CREATININE-BSD FRML MDRD: > 90 ML/MIN/1.73M2
GLUCOSE SERPL-MCNC: 109 MG/DL (ref 70–108)
HBA1C MFR BLD HPLC: 4.9 % (ref 4.4–6.4)
POTASSIUM SERPL-SCNC: 3.9 MEQ/L (ref 3.5–5.2)
SODIUM SERPL-SCNC: 143 MEQ/L (ref 135–145)

## 2024-07-22 ENCOUNTER — TELEPHONE (OUTPATIENT)
Age: 49
End: 2024-07-22

## 2024-09-24 DIAGNOSIS — I10 ESSENTIAL HYPERTENSION: ICD-10-CM

## 2024-09-24 RX ORDER — LOSARTAN POTASSIUM 50 MG/1
TABLET ORAL
Qty: 180 TABLET | Refills: 1 | Status: SHIPPED | OUTPATIENT
Start: 2024-09-24

## 2024-10-05 DIAGNOSIS — I10 ESSENTIAL HYPERTENSION: ICD-10-CM

## 2024-10-07 RX ORDER — AMLODIPINE BESYLATE 10 MG/1
10 TABLET ORAL DAILY
Qty: 90 TABLET | Refills: 3 | Status: SHIPPED | OUTPATIENT
Start: 2024-10-07

## 2024-10-07 NOTE — TELEPHONE ENCOUNTER
Last visit- 5/22/2024  Next visit- 10/28/2024    Requested Prescriptions     Pending Prescriptions Disp Refills    amLODIPine (NORVASC) 10 MG tablet [Pharmacy Med Name: AMLODIPINE BESYLATE 10 MG TAB] 90 tablet 3     Sig: TAKE ONE TABLET BY MOUTH EVERY DAY

## 2025-01-21 NOTE — PROGRESS NOTES
BP: 134/86   Site: Left Lower Arm   Position: Sitting   Cuff Size: Large Adult   Pulse: 76   Resp: 16   Temp: 97.6 °F (36.4 °C)   TempSrc: Temporal   SpO2: 97%   Weight: (!) 216 kg (476 lb 3.1 oz)   Height: 1.905 m (6' 3\")       Wt Readings from Last 3 Encounters:   01/23/25 (!) 216 kg (476 lb 3.1 oz)   05/22/24 (!) 212.9 kg (469 lb 5.8 oz)   04/29/24 (!) 214.6 kg (473 lb)       Physical Exam  Constitutional: Vital signs are normal. He appears well-developed and well-nourished. He is active.   HENT:   Head: Normocephalic and atraumatic.   Right Ear: Tympanic membrane, external ear and ear canal normal. No drainage or tenderness.   Left Ear: Tympanic membrane, external ear and ear canal normal. No drainage or tenderness.   Nose: Nose normal. No mucosal edema or rhinorrhea.   Mouth/Throat: Uvula is midline, oropharynx is clear and moist and mucous membranes are normal. Mucous membranes are not pale. Normal dentition. No posterior oropharyngeal edema or posterior oropharyngeal erythema.   Eyes: Lids are normal. Right eye exhibits no chemosis and no discharge. Left eye exhibits no chemosis and no drainage. Right conjunctiva has no hemorrhage. Left conjunctiva has no hemorrhage. Right eye exhibits normal extraocular motion. Left eye exhibits normal extraocular motion. Right pupil is round and reactive. Left pupil is round and reactive. Pupils are equal.   Cardiovascular: Normal rate, regular rhythm, S1 normal, S2 normal and normal heart sounds.  Exam reveals no gallop.    No murmur heard.  Pulmonary/Chest: Effort normal and breath sounds normal. No respiratory distress. He has no wheezes. He has no rhonchi. He has no rales.   Abdominal: Soft. Normal appearance and bowel sounds are normal. He exhibits no distension and no mass. There is no hepatosplenomegaly. No tenderness. He has no rigidity, no rebound and no guarding. No hernia.   Musculoskeletal:        Right lower leg: He exhibits no edema.        Left lower leg:

## 2025-01-22 ASSESSMENT — PATIENT HEALTH QUESTIONNAIRE - PHQ9
2. FEELING DOWN, DEPRESSED OR HOPELESS: NOT AT ALL
SUM OF ALL RESPONSES TO PHQ QUESTIONS 1-9: 0
SUM OF ALL RESPONSES TO PHQ9 QUESTIONS 1 & 2: 0
SUM OF ALL RESPONSES TO PHQ QUESTIONS 1-9: 0
1. LITTLE INTEREST OR PLEASURE IN DOING THINGS: NOT AT ALL
2. FEELING DOWN, DEPRESSED OR HOPELESS: NOT AT ALL
1. LITTLE INTEREST OR PLEASURE IN DOING THINGS: NOT AT ALL
SUM OF ALL RESPONSES TO PHQ QUESTIONS 1-9: 0
SUM OF ALL RESPONSES TO PHQ QUESTIONS 1-9: 0
SUM OF ALL RESPONSES TO PHQ9 QUESTIONS 1 & 2: 0

## 2025-01-23 ENCOUNTER — LAB (OUTPATIENT)
Dept: LAB | Age: 50
End: 2025-01-23

## 2025-01-23 ENCOUNTER — OFFICE VISIT (OUTPATIENT)
Dept: FAMILY MEDICINE CLINIC | Age: 50
End: 2025-01-23

## 2025-01-23 VITALS
BODY MASS INDEX: 39.17 KG/M2 | SYSTOLIC BLOOD PRESSURE: 134 MMHG | WEIGHT: 315 LBS | DIASTOLIC BLOOD PRESSURE: 86 MMHG | HEART RATE: 76 BPM | TEMPERATURE: 97.6 F | HEIGHT: 75 IN | OXYGEN SATURATION: 97 % | RESPIRATION RATE: 16 BRPM

## 2025-01-23 DIAGNOSIS — R53.83 OTHER FATIGUE: ICD-10-CM

## 2025-01-23 DIAGNOSIS — E78.00 PURE HYPERCHOLESTEROLEMIA: ICD-10-CM

## 2025-01-23 DIAGNOSIS — M1A.0790 IDIOPATHIC CHRONIC GOUT OF FOOT WITHOUT TOPHUS, UNSPECIFIED LATERALITY: ICD-10-CM

## 2025-01-23 DIAGNOSIS — Z82.49: ICD-10-CM

## 2025-01-23 DIAGNOSIS — E88.819 INSULIN RESISTANCE: ICD-10-CM

## 2025-01-23 DIAGNOSIS — G93.2 BENIGN INTRACRANIAL HYPERTENSION: ICD-10-CM

## 2025-01-23 DIAGNOSIS — Z72.0 TOBACCO ABUSE: ICD-10-CM

## 2025-01-23 DIAGNOSIS — G47.33 OBSTRUCTIVE SLEEP APNEA ON CPAP: ICD-10-CM

## 2025-01-23 DIAGNOSIS — K21.9 GASTROESOPHAGEAL REFLUX DISEASE WITHOUT ESOPHAGITIS: ICD-10-CM

## 2025-01-23 DIAGNOSIS — I10 ESSENTIAL HYPERTENSION: ICD-10-CM

## 2025-01-23 DIAGNOSIS — R60.0 PEDAL EDEMA: ICD-10-CM

## 2025-01-23 DIAGNOSIS — E55.9 VITAMIN D DEFICIENCY: ICD-10-CM

## 2025-01-23 DIAGNOSIS — N52.8 OTHER MALE ERECTILE DYSFUNCTION: ICD-10-CM

## 2025-01-23 DIAGNOSIS — F41.9 ANXIETY: ICD-10-CM

## 2025-01-23 DIAGNOSIS — E66.01 MORBID OBESITY WITH BMI OF 50.0-59.9, ADULT: ICD-10-CM

## 2025-01-23 DIAGNOSIS — G93.2 PSEUDOTUMOR CEREBRI: ICD-10-CM

## 2025-01-23 DIAGNOSIS — E88.810 DYSMETABOLIC SYNDROME: ICD-10-CM

## 2025-01-23 DIAGNOSIS — Z00.00 ROUTINE GENERAL MEDICAL EXAMINATION AT A HEALTH CARE FACILITY: Primary | ICD-10-CM

## 2025-01-23 DIAGNOSIS — Z00.00 ROUTINE GENERAL MEDICAL EXAMINATION AT A HEALTH CARE FACILITY: ICD-10-CM

## 2025-01-23 LAB
25(OH)D3 SERPL-MCNC: 19 NG/ML (ref 30–100)
ALBUMIN SERPL BCG-MCNC: 4.1 G/DL (ref 3.5–5.1)
ALP SERPL-CCNC: 72 U/L (ref 38–126)
ALT SERPL W/O P-5'-P-CCNC: 37 U/L (ref 11–66)
ANION GAP SERPL CALC-SCNC: 8 MEQ/L (ref 8–16)
AST SERPL-CCNC: 27 U/L (ref 5–40)
BILIRUB SERPL-MCNC: 0.5 MG/DL (ref 0.3–1.2)
BUN SERPL-MCNC: 14 MG/DL (ref 7–22)
CALCIUM SERPL-MCNC: 9.1 MG/DL (ref 8.5–10.5)
CHLORIDE SERPL-SCNC: 104 MEQ/L (ref 98–111)
CHOLEST SERPL-MCNC: 113 MG/DL (ref 100–199)
CO2 SERPL-SCNC: 31 MEQ/L (ref 23–33)
CREAT SERPL-MCNC: 0.8 MG/DL (ref 0.4–1.2)
DEPRECATED RDW RBC AUTO: 47.3 FL (ref 35–45)
ERYTHROCYTE [DISTWIDTH] IN BLOOD BY AUTOMATED COUNT: 13.4 % (ref 11.5–14.5)
FOLATE SERPL-MCNC: 10.5 NG/ML (ref 4.8–24.2)
GFR SERPL CREATININE-BSD FRML MDRD: > 90 ML/MIN/1.73M2
GLUCOSE FASTING: 101 MG/DL (ref 70–108)
HCT VFR BLD AUTO: 42.4 % (ref 42–52)
HDLC SERPL-MCNC: 37 MG/DL
HGB BLD-MCNC: 13.8 GM/DL (ref 14–18)
LDLC SERPL CALC-MCNC: 50 MG/DL
MCH RBC QN AUTO: 31.2 PG (ref 26–33)
MCHC RBC AUTO-ENTMCNC: 32.5 GM/DL (ref 32.2–35.5)
MCV RBC AUTO: 95.9 FL (ref 80–94)
PLATELET # BLD AUTO: 167 THOU/MM3 (ref 130–400)
PMV BLD AUTO: 10.2 FL (ref 9.4–12.4)
POTASSIUM SERPL-SCNC: 4.1 MEQ/L (ref 3.5–5.2)
PROT SERPL-MCNC: 6.7 G/DL (ref 6.1–8)
RBC # BLD AUTO: 4.42 MILL/MM3 (ref 4.7–6.1)
SODIUM SERPL-SCNC: 143 MEQ/L (ref 135–145)
TRIGL SERPL-MCNC: 128 MG/DL (ref 0–199)
TSH SERPL DL<=0.005 MIU/L-ACNC: 2.14 UIU/ML (ref 0.4–4.2)
URATE SERPL-MCNC: 6.2 MG/DL (ref 3.7–7)
VIT B12 SERPL-MCNC: 368 PG/ML (ref 211–911)
WBC # BLD AUTO: 5.4 THOU/MM3 (ref 4.8–10.8)

## 2025-01-23 RX ORDER — HYDROCHLOROTHIAZIDE 25 MG/1
TABLET ORAL
Qty: 90 TABLET | Refills: 1 | Status: SHIPPED | OUTPATIENT
Start: 2025-01-23

## 2025-01-23 RX ORDER — LOSARTAN POTASSIUM 50 MG/1
TABLET ORAL
Qty: 180 TABLET | Refills: 1 | Status: SHIPPED | OUTPATIENT
Start: 2025-01-23

## 2025-01-23 SDOH — ECONOMIC STABILITY: FOOD INSECURITY: WITHIN THE PAST 12 MONTHS, YOU WORRIED THAT YOUR FOOD WOULD RUN OUT BEFORE YOU GOT MONEY TO BUY MORE.: NEVER TRUE

## 2025-01-23 SDOH — ECONOMIC STABILITY: FOOD INSECURITY: WITHIN THE PAST 12 MONTHS, THE FOOD YOU BOUGHT JUST DIDN'T LAST AND YOU DIDN'T HAVE MONEY TO GET MORE.: NEVER TRUE

## 2025-03-19 ENCOUNTER — OFFICE VISIT (OUTPATIENT)
Age: 50
End: 2025-03-19
Payer: COMMERCIAL

## 2025-03-19 VITALS
HEIGHT: 75 IN | BODY MASS INDEX: 39.17 KG/M2 | DIASTOLIC BLOOD PRESSURE: 88 MMHG | SYSTOLIC BLOOD PRESSURE: 142 MMHG | WEIGHT: 315 LBS | HEART RATE: 84 BPM

## 2025-03-19 DIAGNOSIS — R73.9 HYPERGLYCEMIA: ICD-10-CM

## 2025-03-19 DIAGNOSIS — E87.0 HYPERNATREMIA: Primary | ICD-10-CM

## 2025-03-19 PROCEDURE — 99212 OFFICE O/P EST SF 10 MIN: CPT | Performed by: INTERNAL MEDICINE

## 2025-03-19 PROCEDURE — 4004F PT TOBACCO SCREEN RCVD TLK: CPT | Performed by: INTERNAL MEDICINE

## 2025-03-19 PROCEDURE — G8427 DOCREV CUR MEDS BY ELIG CLIN: HCPCS | Performed by: INTERNAL MEDICINE

## 2025-03-19 PROCEDURE — 3077F SYST BP >= 140 MM HG: CPT | Performed by: INTERNAL MEDICINE

## 2025-03-19 PROCEDURE — 3079F DIAST BP 80-89 MM HG: CPT | Performed by: INTERNAL MEDICINE

## 2025-03-19 PROCEDURE — G8417 CALC BMI ABV UP PARAM F/U: HCPCS | Performed by: INTERNAL MEDICINE

## 2025-03-19 NOTE — PROGRESS NOTES
Wild Montoya is a 49 y.o. , male who comes for f/u for hyperglycemia.    PCP: Sienna Escudero MD    HPI   This patient recently requested his PCP to check his A1c because his father was diabetic.  The A1c came back at 5.1.  As a result the patient was placed on metformin.  He does not believe he has diabetes mellitus, and he would like to have a thorough evaluation to clarify as to whether or not he is diabetic.  At the last visit he was found within normal A1c and fructosamine.  He denies polyuria, polydipsia and visual blurriness.  This patient has had borderline/high sodium level.  We obtained 24-hour urine for volume measurement which came back borderline at 3.01 L.  Overall condition has not changed except that he has lost 2 pounds since the last face-to-face visit.  Past Medical History:   Diagnosis Date    Benign intracranial hypertension 07/16/2013    Dysmetabolic syndrome     Family history of premature ventricular contractions     GERD (gastroesophageal reflux disease)     Gout     Hemorrhoids     History of morbid obesity     Hyperlipemia     Obesity     BHARATHI on CPAP     Pseudotumor cerebri     [facility]    Second opinion for hypertension     Tobacco abuse       Past Surgical History:   Procedure Laterality Date    CARDIOVASCULAR STRESS TEST  5 29 2007    Preserved LV function and estimated EF of 65%.     COLONOSCOPY N/A 2/2/2024    COLONOSCOPY POLYPECTOMY SNARE/COLD BIOPSY performed by Deb Mueller MD at [facility] ENDOSCOPY    FINGER SURGERY      HAND TENDON SURGERY      TRANSTHORACIC ECHOCARDIOGRAM  1 11 2011    EF 55-65% with macrocytic LVH, stage I dysfunction, left atrium mildly dilated probably secondary to diastolic dysfunction. Demonstrated right ventricle markedly dilated.        Family History   Problem Relation Age of Onset    Heart Disease Father         dad had coronary disease when he was 18 yrs old.    Diabetes Father     Pacemaker Father     Cancer Mother         large b-cell

## 2025-04-08 ENCOUNTER — TELEPHONE (OUTPATIENT)
Dept: PULMONOLOGY | Age: 50
End: 2025-04-08

## 2025-04-11 ENCOUNTER — OFFICE VISIT (OUTPATIENT)
Dept: FAMILY MEDICINE CLINIC | Age: 50
End: 2025-04-11
Payer: COMMERCIAL

## 2025-04-11 VITALS
TEMPERATURE: 97.3 F | SYSTOLIC BLOOD PRESSURE: 138 MMHG | OXYGEN SATURATION: 98 % | WEIGHT: 315 LBS | HEIGHT: 75 IN | DIASTOLIC BLOOD PRESSURE: 80 MMHG | HEART RATE: 87 BPM | BODY MASS INDEX: 39.17 KG/M2 | RESPIRATION RATE: 16 BRPM

## 2025-04-11 DIAGNOSIS — M70.61 GREATER TROCHANTERIC BURSITIS OF RIGHT HIP: Primary | ICD-10-CM

## 2025-04-11 PROCEDURE — 4004F PT TOBACCO SCREEN RCVD TLK: CPT | Performed by: NURSE PRACTITIONER

## 2025-04-11 PROCEDURE — G8417 CALC BMI ABV UP PARAM F/U: HCPCS | Performed by: NURSE PRACTITIONER

## 2025-04-11 PROCEDURE — G8427 DOCREV CUR MEDS BY ELIG CLIN: HCPCS | Performed by: NURSE PRACTITIONER

## 2025-04-11 PROCEDURE — 99213 OFFICE O/P EST LOW 20 MIN: CPT | Performed by: NURSE PRACTITIONER

## 2025-04-11 PROCEDURE — 3075F SYST BP GE 130 - 139MM HG: CPT | Performed by: NURSE PRACTITIONER

## 2025-04-11 PROCEDURE — 3079F DIAST BP 80-89 MM HG: CPT | Performed by: NURSE PRACTITIONER

## 2025-04-11 RX ORDER — PREDNISONE 5 MG/1
TABLET ORAL
Qty: 45 TABLET | Refills: 0 | Status: SHIPPED | OUTPATIENT
Start: 2025-04-11 | End: 2025-04-21

## 2025-04-11 ASSESSMENT — ENCOUNTER SYMPTOMS
EYES NEGATIVE: 1
RESPIRATORY NEGATIVE: 1
GASTROINTESTINAL NEGATIVE: 1

## 2025-04-11 NOTE — PROGRESS NOTES
Wild Montoya is a 49 y.o. male who presents today for :  Chief Complaint   Patient presents with    Hip Pain     RT Hip. Has been occurring x 4 days. Denies any injury.     Vitals:    04/11/25 0953   BP: 138/80   Pulse: 87   Resp: 16   Temp: 97.3 °F (36.3 °C)   SpO2: 98%       HPI:     History of Present Illness  The patient presents for evaluation of right hip pain.    He reports experiencing right hip pain upon awakening on Tuesday morning, describing the sensation as akin to walking with an airbag in his hip. The pain fluctuated throughout the day, subsiding by midday but intensifying by nightfall. On Wednesday, the pain persisted, impeding his mobility and preventing him from performing simple tasks such as putting on socks. By Thursday, the pain had escalated to the point where he was nearly unable to walk. Chiropractic treatment was sought yesterday, where potential fluid accumulation in the hip was mentioned. Today, the pain is less severe than yesterday, but he has abstained from work and has been resting. No specific incident that could have triggered the pain is reported, but frequent standing on the affected hip while working at his computer is noted. He has a history of similar pain, but not to this extent. The area of pain can be localized. No itching or burning sensations, and no heat in the area are reported. Radiating pain down to the knee was experienced while driving, which resolved upon repositioning the leg. Sleeping on the side is avoided due to a pulling sensation. The current pain is described as similar to gout, but with additional stiffness. Pain management has included ibuprofen, Aleve, ice, and Deep Blue Rub. A history of gout, last experienced in 2010, is noted, and he is currently on allopurinol.    Influenza was experienced in January 2025. An endocrinologist was seen recently, and an A1c test is needed.         Patient Active Problem List   Diagnosis    Family history of premature

## 2025-05-12 DIAGNOSIS — E78.00 PURE HYPERCHOLESTEROLEMIA: ICD-10-CM

## 2025-05-12 RX ORDER — ATORVASTATIN CALCIUM 20 MG/1
20 TABLET, FILM COATED ORAL DAILY
Qty: 30 TABLET | Refills: 11 | Status: SHIPPED | OUTPATIENT
Start: 2025-05-12

## 2025-07-01 ENCOUNTER — OFFICE VISIT (OUTPATIENT)
Age: 50
End: 2025-07-01
Payer: COMMERCIAL

## 2025-07-01 VITALS
DIASTOLIC BLOOD PRESSURE: 76 MMHG | HEIGHT: 75 IN | WEIGHT: 315 LBS | OXYGEN SATURATION: 97 % | BODY MASS INDEX: 39.17 KG/M2 | SYSTOLIC BLOOD PRESSURE: 142 MMHG | TEMPERATURE: 98.9 F | HEART RATE: 86 BPM

## 2025-07-01 DIAGNOSIS — E66.01 MORBID OBESITY WITH BMI OF 50.0-59.9, ADULT (HCC): ICD-10-CM

## 2025-07-01 DIAGNOSIS — G47.33 OSA ON CPAP: Primary | ICD-10-CM

## 2025-07-01 PROCEDURE — 99214 OFFICE O/P EST MOD 30 MIN: CPT

## 2025-07-01 PROCEDURE — G8417 CALC BMI ABV UP PARAM F/U: HCPCS

## 2025-07-01 PROCEDURE — 3078F DIAST BP <80 MM HG: CPT

## 2025-07-01 PROCEDURE — 3077F SYST BP >= 140 MM HG: CPT

## 2025-07-01 PROCEDURE — G8427 DOCREV CUR MEDS BY ELIG CLIN: HCPCS

## 2025-07-01 PROCEDURE — 4004F PT TOBACCO SCREEN RCVD TLK: CPT

## 2025-07-01 ASSESSMENT — ENCOUNTER SYMPTOMS
COUGH: 0
SORE THROAT: 0
SHORTNESS OF BREATH: 0
WHEEZING: 0
RHINORRHEA: 0

## 2025-07-01 NOTE — PROGRESS NOTES
[facility]      Wild Montoya         [id number]  7/1/2025   Chief Complaint   Patient presents with    Follow-up     Aurora 14 mo f/u with download        Assessment/Plan      Diagnosis Orders   1. AURORA on CPAP  DME Order for CPAP as OP      2. Morbid obesity with BMI of 50.0-59.9, adult (HCC)               -Yearly supply order placed? Yes  -Download was personally reviewed and discussed with patient at length.  -The symptoms of AURORA have improved. The patient is benefiting from therapy and should continue use of PAP device.  -Patient's symptoms and AHI are controlled with current settings of 7 cmH2O. Continue current pressure settings.  -Advised to continue current positive airway pressure therapy with above described pressure.   -Advised to keep good compliance with current recommended pressure to get optimal results and clinical improvement  -Recommend 7-9 hours of sleep with PAP  -Instructed to call DME company regarding supplies if needed.   -Patient to call my office for earlier appointment if needed for worsening of sleep symptoms.   -Patient is not to drive if feeling sleepy  -Counseled patient on weight loss    Educated about my impression and plan. Patient verbalizes understanding.      Return in about 1 year (around 7/1/2026) for aurora. with download.      Subjective   Presentation:   Wild presents for sleep medicine follow up for obstructive sleep apnea.  Since the last visit, Wild has been doing very well on PAP therapy and reports good benefit from compliant use of PAP machine. No complaints of problems with machine, mask, or pressures at this time.       Denies any new medical conditions since last visit    Any new/current sleep medicines? No      Sleep issues:  Subjective benefit with PAP therapy? Yes  Difficulty falling/staying sleep?  No      PAP Download:   Original or initial AHI: 38.3     Date of initial study: 3/6/11      Recorded compliance dates: 5/31

## 2025-07-07 DIAGNOSIS — M1A.0790 IDIOPATHIC CHRONIC GOUT OF FOOT WITHOUT TOPHUS, UNSPECIFIED LATERALITY: ICD-10-CM

## 2025-07-07 RX ORDER — ALLOPURINOL 100 MG/1
100 TABLET ORAL DAILY
Qty: 90 TABLET | Refills: 1 | Status: SHIPPED | OUTPATIENT
Start: 2025-07-07

## 2025-07-17 ENCOUNTER — LAB (OUTPATIENT)
Dept: LAB | Age: 50
End: 2025-07-17

## 2025-07-17 DIAGNOSIS — E88.819 INSULIN RESISTANCE: ICD-10-CM

## 2025-07-17 LAB
ANION GAP SERPL CALC-SCNC: 15 MEQ/L (ref 8–16)
BUN SERPL-MCNC: 13 MG/DL (ref 8–23)
CALCIUM SERPL-MCNC: 9.1 MG/DL (ref 8.6–10)
CHLORIDE SERPL-SCNC: 104 MEQ/L (ref 98–111)
CO2 SERPL-SCNC: 22 MEQ/L (ref 22–29)
CREAT SERPL-MCNC: 0.8 MG/DL (ref 0.7–1.2)
DEPRECATED MEAN GLUCOSE BLD GHB EST-ACNC: 93 MG/DL (ref 70–126)
GFR SERPL CREATININE-BSD FRML MDRD: > 90 ML/MIN/1.73M2
GLUCOSE SERPL-MCNC: 109 MG/DL (ref 74–109)
HBA1C MFR BLD HPLC: 5.1 % (ref 4–6)
POTASSIUM SERPL-SCNC: 3.9 MEQ/L (ref 3.5–5.2)
SODIUM SERPL-SCNC: 141 MEQ/L (ref 135–145)

## 2025-07-22 NOTE — PROGRESS NOTES
[facility] PROFESSIONAL SERVS  [facility] MEDICINE  [address]  [address]  [facility] [address]  Dept: [phone]  Dept Fax: [phone]  Loc: [phone]    Wild Montoya is a 49 y.o. male who presents today for:  Chief Complaint   Patient presents with    Follow-up     6 months follow-up.    Mass     Lumps on front of left lower leg. Noticed it in January. Little bit of pain, but not a lot.       HPI:     HPI  Yearly checkup.    Hyperlipidemia: Patient presents with hyperlipidemia.  He was tested because hypertension.  His last labs showed   Lab Results   Component Value Date    CHOL 113 01/23/2025    CHOL 112 12/15/2023    CHOL 114 03/31/2023     Lab Results   Component Value Date    TRIG 128 01/23/2025    TRIG 107 12/15/2023    TRIG 93 03/31/2023     Lab Results   Component Value Date    HDL 37 01/23/2025    HDL 35 12/15/2023    HDL 36 03/31/2023     No components found for: \"LDLCHOLESTEROL\", \"LDLCALC\"    No results found for: \"VLDL\"  No results found for: \"CHOLHDLRATIO\"  There is a family history of hyperlipidemia. There is a family history of early ischemia heart disease.    Hypertension: Patient here for follow-up of elevated blood pressure. He is exercising and is not adherent to low salt diet.  Blood pressure is well controlled at home. Cardiac symptoms none. Patient denies chest pain, dyspnea and palpitations.  Cardiovascular risk factors: none. Use of agents associated with hypertension: none. History of target organ damage: none.    GERD well controlled on the PPI.    BHARATHI on CPAP.    Gout controlled on allopurinol.     Reports that he has been smoking Cigarettes again.  He has a 20.00 pack-year smoking history. He has never used smokeless tobacco.  Not ready to quit.     Calling for ED since 5/2020.   He is having trouble with his marriage that they are gong to counseling for. He is able to obtain an erection but soft and does not maintain to ejaculation.  Morning

## 2025-07-23 ENCOUNTER — OFFICE VISIT (OUTPATIENT)
Dept: FAMILY MEDICINE CLINIC | Age: 50
End: 2025-07-23
Payer: COMMERCIAL

## 2025-07-23 VITALS
TEMPERATURE: 97.4 F | SYSTOLIC BLOOD PRESSURE: 144 MMHG | RESPIRATION RATE: 16 BRPM | WEIGHT: 315 LBS | HEART RATE: 86 BPM | DIASTOLIC BLOOD PRESSURE: 88 MMHG | BODY MASS INDEX: 39.17 KG/M2 | HEIGHT: 75 IN | OXYGEN SATURATION: 98 %

## 2025-07-23 DIAGNOSIS — K21.9 GASTROESOPHAGEAL REFLUX DISEASE WITHOUT ESOPHAGITIS: ICD-10-CM

## 2025-07-23 DIAGNOSIS — N52.8 OTHER MALE ERECTILE DYSFUNCTION: ICD-10-CM

## 2025-07-23 DIAGNOSIS — R60.0 PEDAL EDEMA: ICD-10-CM

## 2025-07-23 DIAGNOSIS — Z82.49: ICD-10-CM

## 2025-07-23 DIAGNOSIS — E88.819 INSULIN RESISTANCE: ICD-10-CM

## 2025-07-23 DIAGNOSIS — F41.9 ANXIETY: ICD-10-CM

## 2025-07-23 DIAGNOSIS — Z72.0 TOBACCO ABUSE: ICD-10-CM

## 2025-07-23 DIAGNOSIS — R10.9 FLANK PAIN: ICD-10-CM

## 2025-07-23 DIAGNOSIS — L03.116 CELLULITIS OF LEFT LEG: ICD-10-CM

## 2025-07-23 DIAGNOSIS — G47.33 OBSTRUCTIVE SLEEP APNEA ON CPAP: ICD-10-CM

## 2025-07-23 DIAGNOSIS — M70.61 GREATER TROCHANTERIC BURSITIS OF RIGHT HIP: ICD-10-CM

## 2025-07-23 DIAGNOSIS — E66.01 MORBID OBESITY WITH BMI OF 50.0-59.9, ADULT (HCC): ICD-10-CM

## 2025-07-23 DIAGNOSIS — R53.83 OTHER FATIGUE: ICD-10-CM

## 2025-07-23 DIAGNOSIS — R73.9 ELEVATED RANDOM BLOOD GLUCOSE LEVEL: ICD-10-CM

## 2025-07-23 DIAGNOSIS — G93.2 PSEUDOTUMOR CEREBRI: ICD-10-CM

## 2025-07-23 DIAGNOSIS — I10 ESSENTIAL HYPERTENSION: ICD-10-CM

## 2025-07-23 DIAGNOSIS — E55.9 VITAMIN D DEFICIENCY: ICD-10-CM

## 2025-07-23 DIAGNOSIS — E78.00 PURE HYPERCHOLESTEROLEMIA: ICD-10-CM

## 2025-07-23 DIAGNOSIS — I89.0 LYMPHEDEMA: ICD-10-CM

## 2025-07-23 DIAGNOSIS — G93.2 BENIGN INTRACRANIAL HYPERTENSION: ICD-10-CM

## 2025-07-23 DIAGNOSIS — E88.810 DYSMETABOLIC SYNDROME: ICD-10-CM

## 2025-07-23 DIAGNOSIS — L84 CALLUS OF FOOT: ICD-10-CM

## 2025-07-23 DIAGNOSIS — M1A.0790 IDIOPATHIC CHRONIC GOUT OF FOOT WITHOUT TOPHUS, UNSPECIFIED LATERALITY: Primary | ICD-10-CM

## 2025-07-23 PROCEDURE — G8417 CALC BMI ABV UP PARAM F/U: HCPCS | Performed by: FAMILY MEDICINE

## 2025-07-23 PROCEDURE — 4004F PT TOBACCO SCREEN RCVD TLK: CPT | Performed by: FAMILY MEDICINE

## 2025-07-23 PROCEDURE — 99215 OFFICE O/P EST HI 40 MIN: CPT | Performed by: FAMILY MEDICINE

## 2025-07-23 PROCEDURE — 3077F SYST BP >= 140 MM HG: CPT | Performed by: FAMILY MEDICINE

## 2025-07-23 PROCEDURE — G8427 DOCREV CUR MEDS BY ELIG CLIN: HCPCS | Performed by: FAMILY MEDICINE

## 2025-07-23 PROCEDURE — 3079F DIAST BP 80-89 MM HG: CPT | Performed by: FAMILY MEDICINE

## 2025-07-23 RX ORDER — POTASSIUM CHLORIDE 1500 MG/1
20 TABLET, EXTENDED RELEASE ORAL DAILY
Qty: 90 TABLET | Refills: 1 | Status: SHIPPED | OUTPATIENT
Start: 2025-07-23

## 2025-07-23 RX ORDER — SULFAMETHOXAZOLE AND TRIMETHOPRIM 800; 160 MG/1; MG/1
1 TABLET ORAL 2 TIMES DAILY
Qty: 20 TABLET | Refills: 0 | Status: SHIPPED | OUTPATIENT
Start: 2025-07-23 | End: 2025-08-02

## 2025-07-23 RX ORDER — LOSARTAN POTASSIUM 50 MG/1
TABLET ORAL
Qty: 180 TABLET | Refills: 1 | Status: SHIPPED | OUTPATIENT
Start: 2025-07-23

## 2025-07-23 RX ORDER — HYDROCHLOROTHIAZIDE 25 MG/1
TABLET ORAL
Qty: 90 TABLET | Refills: 1 | Status: SHIPPED | OUTPATIENT
Start: 2025-07-23

## 2025-07-23 RX ORDER — FUROSEMIDE 20 MG/1
20 TABLET ORAL DAILY
Qty: 90 TABLET | Refills: 3 | Status: SHIPPED | OUTPATIENT
Start: 2025-07-23

## (undated) DEVICE — GLOVE ORTHO 8   MSG9480